# Patient Record
Sex: FEMALE | Race: WHITE
[De-identification: names, ages, dates, MRNs, and addresses within clinical notes are randomized per-mention and may not be internally consistent; named-entity substitution may affect disease eponyms.]

---

## 2017-03-09 ENCOUNTER — HOSPITAL ENCOUNTER (INPATIENT)
Dept: HOSPITAL 62 - LC | Age: 22
LOS: 1 days | Discharge: HOME | End: 2017-03-10
Attending: OBSTETRICS & GYNECOLOGY | Admitting: OBSTETRICS & GYNECOLOGY
Payer: OTHER GOVERNMENT

## 2017-03-09 DIAGNOSIS — F17.210: ICD-10-CM

## 2017-03-09 DIAGNOSIS — Z28.21: ICD-10-CM

## 2017-03-09 DIAGNOSIS — F32.9: ICD-10-CM

## 2017-03-09 DIAGNOSIS — Z3A.40: ICD-10-CM

## 2017-03-09 LAB
APPEARANCE UR: (no result)
BARBITURATES UR QL SCN: NEGATIVE
BASOPHILS # BLD AUTO: 0 10^3/UL (ref 0–0.2)
BASOPHILS NFR BLD AUTO: 0.5 % (ref 0–2)
BILIRUB UR QL STRIP: NEGATIVE
EOSINOPHIL # BLD AUTO: 0.1 10^3/UL (ref 0–0.6)
EOSINOPHIL NFR BLD AUTO: 1.2 % (ref 0–6)
ERYTHROCYTE [DISTWIDTH] IN BLOOD BY AUTOMATED COUNT: 13.4 % (ref 11.5–14)
GLUCOSE UR STRIP-MCNC: NEGATIVE MG/DL
HCT VFR BLD CALC: 29.7 % (ref 36–47)
HGB BLD-MCNC: 10.2 G/DL (ref 12–15.5)
HGB HCT DIFFERENCE: 0.9
KETONES UR STRIP-MCNC: NEGATIVE MG/DL
LYMPHOCYTES # BLD AUTO: 1.7 10^3/UL (ref 0.5–4.7)
LYMPHOCYTES NFR BLD AUTO: 18.2 % (ref 13–45)
MCH RBC QN AUTO: 29.1 PG (ref 27–33.4)
MCHC RBC AUTO-ENTMCNC: 34.4 G/DL (ref 32–36)
MCV RBC AUTO: 85 FL (ref 80–97)
METHADONE UR QL SCN: NEGATIVE
MONOCYTES # BLD AUTO: 0.4 10^3/UL (ref 0.1–1.4)
MONOCYTES NFR BLD AUTO: 4.7 % (ref 3–13)
NEUTROPHILS # BLD AUTO: 7.2 10^3/UL (ref 1.7–8.2)
NEUTS SEG NFR BLD AUTO: 75.4 % (ref 42–78)
NITRITE UR QL STRIP: NEGATIVE
PCP UR QL SCN: NEGATIVE
PH UR STRIP: 6 [PH] (ref 5–9)
PROT UR STRIP-MCNC: 30 MG/DL
RBC # BLD AUTO: 3.51 10^6/UL (ref 3.72–5.28)
SP GR UR STRIP: 1.02
URINE OPIATES LOW: NEGATIVE
UROBILINOGEN UR-MCNC: NEGATIVE MG/DL (ref ?–2)
WBC # BLD AUTO: 9.5 10^3/UL (ref 4–10.5)

## 2017-03-09 PROCEDURE — 85025 COMPLETE CBC W/AUTO DIFF WBC: CPT

## 2017-03-09 PROCEDURE — 4A1HXCZ MONITORING OF PRODUCTS OF CONCEPTION, CARDIAC RATE, EXTERNAL APPROACH: ICD-10-PCS | Performed by: MIDWIFE

## 2017-03-09 PROCEDURE — 80307 DRUG TEST PRSMV CHEM ANLYZR: CPT

## 2017-03-09 PROCEDURE — 81005 URINALYSIS: CPT

## 2017-03-09 PROCEDURE — 86592 SYPHILIS TEST NON-TREP QUAL: CPT

## 2017-03-09 PROCEDURE — 85027 COMPLETE CBC AUTOMATED: CPT

## 2017-03-09 PROCEDURE — 10907ZC DRAINAGE OF AMNIOTIC FLUID, THERAPEUTIC FROM PRODUCTS OF CONCEPTION, VIA NATURAL OR ARTIFICIAL OPENING: ICD-10-PCS | Performed by: MIDWIFE

## 2017-03-09 PROCEDURE — 86900 BLOOD TYPING SEROLOGIC ABO: CPT

## 2017-03-09 PROCEDURE — 86901 BLOOD TYPING SEROLOGIC RH(D): CPT

## 2017-03-09 PROCEDURE — 36415 COLL VENOUS BLD VENIPUNCTURE: CPT

## 2017-03-09 PROCEDURE — 86850 RBC ANTIBODY SCREEN: CPT

## 2017-03-09 RX ADMIN — DOCUSATE SODIUM SCH MG: 100 CAPSULE, LIQUID FILLED ORAL at 18:42

## 2017-03-09 RX ADMIN — FERROUS SULFATE TAB 325 MG (65 MG ELEMENTAL FE) SCH MG: 325 (65 FE) TAB at 18:42

## 2017-03-09 RX ADMIN — IBUPROFEN SCH MG: 800 TABLET, FILM COATED ORAL at 21:01

## 2017-03-09 RX ADMIN — SODIUM CHLORIDE, SODIUM LACTATE, POTASSIUM CHLORIDE, AND CALCIUM CHLORIDE PRN ML: .6; .31; .03; .02 INJECTION, SOLUTION INTRAVENOUS at 13:12

## 2017-03-09 RX ADMIN — PENICILLIN G POTASSIUM SCH UNIT: 5000000 POWDER, FOR SOLUTION INTRAMUSCULAR; INTRAPLEURAL; INTRATHECAL; INTRAVENOUS at 13:14

## 2017-03-09 RX ADMIN — SODIUM CHLORIDE, SODIUM LACTATE, POTASSIUM CHLORIDE, AND CALCIUM CHLORIDE PRN ML: .6; .31; .03; .02 INJECTION, SOLUTION INTRAVENOUS at 13:04

## 2017-03-09 RX ADMIN — PENICILLIN G POTASSIUM SCH UNIT: 5000000 POWDER, FOR SOLUTION INTRAMUSCULAR; INTRAPLEURAL; INTRATHECAL; INTRAVENOUS at 18:42

## 2017-03-09 RX ADMIN — PENICILLIN G POTASSIUM SCH UNIT: 5000000 POWDER, FOR SOLUTION INTRAMUSCULAR; INTRAPLEURAL; INTRATHECAL; INTRAVENOUS at 19:50

## 2017-03-09 NOTE — L&D PROGRESS NOTES
=================================================================

PROGRESS NOTES

=================================================================

Datetime Report Generated by CPN: 2017 12:40

   

   

=================================================================

PROGRESS NOTE

=================================================================

   

Impression:  Normal Progression of Labor

Impression:  Normal Progression of Labor

Procedures:  Artificial ROM; Sterile Vag Exam

Procedures:  Artificial ROM; Sterile Vag Exam

Plan:  Continue Present Management

Plan:  Continue Present Management

Informed Consent Obtained:  Vaginal Delivery

Informed Consent Obtained:  Vaginal Delivery

Vital Signs :  Reviewed

Vital Signs :  Reviewed; Within Normal Limits

Comment:  Comfortable with epidural

   AROM, clear

   Anticipate 

   

=================================================================

VAGINAL EXAM

=================================================================

   

Dilatation:  8

Dilatation:  7

Effacement:  100

Effacement:  90

Station:  1

Station:  0

Contractions:  irregular

Contractions:  2-6

   

=================================================================

MEMBRANES

=================================================================

   

Membranes:  Ruptured

Membranes:  Intact

Amniotic Fluid Color:  Clear

   

=================================================================

FETUS A

=================================================================

   

FHR - Baseline:  125

Monitoring:  External US

Variability:  Moderate 6-25bpm

Decelerations:  None

FHR Category:  Category I

Estimated Fetal Weight (gm):  3400

Fetal Presentation:  Vertex

   

=================================================================

SIGNATURE

=================================================================

   

SIGNATURE:  [id number]

Assignment:  Mariely Hernandez MD

Signature:  Electronically signed by Manisha Ibarra CNM on 3/9/2017 at

   12:38  with User ID: [email or web address]

:  Electronically signed by Manisha Ibarra CNM on 3/9/2017 at 12:38  with

   User ID: Jose

## 2017-03-09 NOTE — DELIVERY SUMMARY
=================================================================

Del Sum A-C

=================================================================

Datetime Report Generated by CPN: 2017 16:03

   

   

=================================================================

ADMISSION DATA

=================================================================

   

Chief Complaint:  Uterine Contractions

Indication for Induction:  Not Applicable

Admission Impression:  Term, Intrauterine Pregnancy; Active Labor;

   Intact Membranes

Admission Impression Comments:  GBS positive

Admit Provider Comments:  Active labor

   GBS positive, pcn

   Pt may have epidural

   Anticipate 

   

=================================================================

DELIVERY PERSONNEL

=================================================================

   

Delivery Doctor::  Manisha Ibarra CNM

Nurse Midwife Certified::  Manisha Ibarra CNM

Labor and Delivery Nurse::  Tin Jc RN

Labor and Delivery Nurse::  Makeda Sabillon RN

Student Observers::  Virginia Amayaub Tech/YAN:  Nancy Lockhart CNA II

   

=================================================================

MATERNAL INFORMATION

=================================================================

   

Delivery Anesthesia:  Epidural

Medications After Delivery:  Pitocin Bolus-Please Comment

Meds After Delivery Comment:  Gave 20 Units/1000ml NS

Estimated  Blood Loss (ml):  200

Maternal Complications:  None

Provider Comments:   of viable female infant over intact perineum,

   head delivered without difficulty, loose nuchal cord X1 noted, 

   reduced, shoulders and body delivered without difficulty.  Infant

   with spontaneous cry and respirations to maternal abdomen cord

   clamped X2, infant cut free after 2 minute delay. Infant kept skin

   to skin. Spontaneous delivery of intact placenta, 3 VC via haley

   mechanism. Vagina and perineum and inspected, no lacerations noted,

   hemostasis acheived with external fundal massage and IV pitocin.

   Mother and infant in stable condition, routine pp care. 

   

=================================================================

LABOR SUMMARY

=================================================================

   

EDC:  2017 00:00

No. Babies in Womb:  1

 Attempted:  No

Labor Anesthesia:  Epidural

   

=================================================================

LABOR INFORMATION

=================================================================

   

Reason for Induction:  Not Applicable

Onset of Labor:  2017 08:45

Complete Dilatation:  2017 13:51

Other Ripening Agents:  None

Oxytocin:  N/A

Group B Beta Strep:  positive

Antibiotics # of Doses:  2

Antibiotics Time of Last Dose:  1300

Name of Antibiotic Given:  PCN

Steroids Given:  None

Reason Steroids Not Administered:  Not Applicable

   

=================================================================

MEMBRANES

=================================================================

   

Membranes Rupture Method:  Artificial

Rupture of Membranes:  2017 12:33

Length of Rupture (hr):  1.33

Amniotic Fluid Color:  Clear

Amniotic Fluid Amount:  Moderate

Amniotic Fluid Odor:  Normal

   

=================================================================

STAGES OF LABOR

=================================================================

   

Stage 1 hr:  5

Stage 1 min:  6

Stage 2 hr:  0

Stage 2 min:  2

Stage 3 hr:  0

Stage 3 min:  4

Total Time in Labor hr:  5

Total Time in Labor min:  12

   

=================================================================

VAGINAL DELIVERY

=================================================================

   

Episiotomy:  None

Laceration Extension:  N/A

Laceration Type:  None

Laceration Repair:  Not Applicable

Laceration Repair Note:  n/a

Sponge Count Correct:  N/A

Sharps Count Correct:  N/A

   

=================================================================

CSECTION DELIVERY

=================================================================

   

Primary Indication:  N/A

Secondary Indication:  N/A

CSection Incidence:  N/A

Labor:  N/A

Elective:  N/A

CSection Incision:  N/A

   

=================================================================

BABY A INFORMATION

=================================================================

   

Infant Delivery Date/Time:  2017 13:53

Method of Delivery:  Vaginal

Born in Route :  No

:  N/A

Forceps:  N/A

Vacuum Extraction:  N/A

Shoulder Dystocia :  No

   

=================================================================

PRESENTATION/POSITION BABY A

=================================================================

   

Presentation:  Cephalic

Cephalic Presentation:  Vertex

Vertex Position:  Left Occipital Anterior

Breech Presentation:  N/A

   

=================================================================

PLACENTA INFORMATION BABY A

=================================================================

   

Placenta Delivery Time :  2017 13:57

Placenta Method of Delivery:  Spontaneous

Placenta Status:  Delivered

   

=================================================================

APGAR SCORES BABY A

=================================================================

   

Heart Rate 1 min:  >100 bpm

Resp Effort 1 min:  Good Cry

Reflex Irritability 1 min:  Cough or Sneeze or Pulls Away

Muscle Tone 1 min:  Active Motion

Color 1 min:  Body Pink, Extremities Blue

Resuscitation Effort 1 min:  N/A

APGAR SCORE 1 MIN:  9

Heart Rate 5 min:  >100 bpm

Resp Effort 5 min:  Good Cry

Reflex Irritability 5 min:  Cough or Sneeze or Pulls Away

Muscle Tone 5 min:  Active Motion

Color 5 min:  Body Pink, Extremities Blue

Resuscitation Effort 5 min:  N/A

APGAR SCORE 5 MIN:  9

   

=================================================================

INFANT INFORMATION BABY A

=================================================================

   

Gestational Age at Delivery:  40.1

Gestational Status:  Full Term- 39- 40.6 Weeks

Infant Outcome :  Liveborn

Infant Condition :  Stable

Infant Sex:  Female

   

=================================================================

IDENTIFICATION BABY A

=================================================================

   

Infant Verification Date/Time:  2017 14:46

ID Band Number:  [id number]

Mother's Name Verified:  Yes

Infant Medical Record Number:  [id number]

RN Verifying Infant:  PO Jc RN

Additional Verifying Personnel:  ELIA Sabillon RN

   

=================================================================

WEIGHT/LENGTH BABY A

=================================================================

   

Infant Birthweight (gm):  3425

Infant Weight (lb):  7

Infant Weight (oz):  9

Infant Length (in):  18.50

Infant Length (cm):  46.99

   

=================================================================

CORD INFORMATION BABY A

=================================================================

   

No. Cord Vessels:  3

Nuchal Cord :  N/A

Cord Blood Taken:  Yes-For Storage (Mom's Blood type +)

Infant Suction:  None

   

=================================================================

ASSESSMENT BABY A

=================================================================

   

Infant Complications:  Other

Infant Complications- Other:  TERMINAL MECONIUM

Physical Findings at Delivery:  Within Normal Limits

Infant Respirations:  Appears Normal

Skin to Skin:  Yes

Skin to Skin Time (min):  60

Neonatologist/ALS Called :  No

Infant Care By:  ELIA SABILLON RN; CLAUDIO JC RN

Transferred To:  Remains with Mother

   

=================================================================

BABY B INFORMATION

=================================================================

   

 :  N/A

   

=================================================================

SIGNATURES

=================================================================

   

Assignment:  Mariely Hernandez MD

Signature:  Electronically signed by Manisha Ibarra CNM on 3/9/2017 at

   14:07  with User ID: [id number]

:  Electronically signed by Manisha Ibarra CNM on 3/9/2017 at 14:07  with

   User ID: Jose

## 2017-03-09 NOTE — L&D FLOW SHEET
=================================================================

LD Flowsheet

=================================================================

Datetime Report Generated by CPN: 03/09/2017 16:00

   

   

=================================================================

Datetime: 03/09/2017 15:45

=================================================================

   

Stage of Pregnancy:  Recovery (Tin Abdalla RN)

 Pulse:  63 (Tin Abdalla RN)

 Respirations:  16 (Tin Abdalla RN)

 Pain Scale:  0 (Tin Abdalla RN)

 Pain Presence:  None/Denies (Tin Abdalla RN)

 Pain Type:  N/A (Tin Abdalla RN)

 Pain Goal:  0 (Tin Abdalla RN)

   

=================================================================

Datetime: 03/09/2017 15:32

=================================================================

   

 NBP Sys/Mela/Mean (mmHg):  116 (QS system process)

:  59 (QS system process)

:  82 (QS system process)

 Pulse:  63 (QS system process)

   

=================================================================

Datetime: 03/09/2017 15:30

=================================================================

   

Stage of Pregnancy:  Recovery (Tin Abdalla RN)

 Respirations:  16 (Tin YOLY Abdalla)

 Pain Scale:  0 (Tin YOLY Abdalla)

 Pain Presence:  None/Denies (Tin YOLY Abdalla)

 Pain Type:  N/A (Tin YOLY Abdalla)

 Pain Goal:  0 (Tin Rm RN)

   

=================================================================

Datetime: 03/09/2017 15:17

=================================================================

   

 NBP Sys/Mela/Mean (mmHg):  125 (QS system process)

:  74 (QS system process)

:  88 (QS system process)

 Pulse:  88 (QS system process)

   

=================================================================

Datetime: 03/09/2017 15:15

=================================================================

   

Stage of Pregnancy:  Recovery (Tin Rm, RN)

 Pain Scale:  0 (Tin Abdalla RN)

 Pain Presence:  None/Denies (Tin Abdalla RN)

 Pain Type:  N/A (Tin Abdalla RN)

 Pain Goal:  0 (Tin Abdalla RN)

   

=================================================================

Datetime: 03/09/2017 15:02

=================================================================

   

 NBP Sys/Mela/Mean (mmHg):  117 (QS system process)

:  66 (QS system process)

:  86 (QS system process)

 Pulse:  73 (QS system process)

   

=================================================================

Datetime: 03/09/2017 15:00

=================================================================

   

Stage of Pregnancy:  Recovery (Tin Rm, RN)

 Pain Scale:  0 (Tin Abdalla RN)

 Pain Presence:  None/Denies (Tin Abdalla RN)

 Pain Type:  N/A (Tin Abdalla RN)

 Pain Goal:  0 (Tin Abdalla RN)

   

=================================================================

Datetime: 03/09/2017 14:47

=================================================================

   

 NBP Sys/Mela/Mean (mmHg):  118 (QS system process)

:  63 (QS system process)

:  86 (QS system process)

 Pulse:  69 (QS system process)

   

=================================================================

Datetime: 03/09/2017 14:45

=================================================================

   

Stage of Pregnancy:  Recovery (Tin Abdalla RN)

 Pulse:  73 (Tin Abdalla RN)

 Respirations:  16 (Tin Abdalla RN)

 Temperature (F):  97.8 (Tin Abdalla RN)

 Temperature (C):  36.6 (QS system process)

 Temperature Route:  Oral (Tin Abdalla RN)

 Pain Scale:  0 (Tin Abdalla RN)

 Pain Presence:  None/Denies (Tin Abdalla RN)

 Pain Type:  N/A (Tin Abdalla RN)

 Pain Goal:  0 (Tin Rm, RN)

   

=================================================================

Datetime: 03/09/2017 14:32

=================================================================

   

 NBP Sys/Mela/Mean (mmHg):  120 (QS system process)

:  60 (QS system process)

:  87 (QS system process)

 Pulse:  74 (QS system process)

   

=================================================================

Datetime: 03/09/2017 14:30

=================================================================

   

Stage of Pregnancy:  Recovery (Tin Rm, RN)

   

=================================================================

Datetime: 03/09/2017 14:18

=================================================================

   

 NBP Sys/Mela/Mean (mmHg):  116 (QS system process)

:  56 (QS system process)

:  81 (QS system process)

 Pulse:  71 (QS system process)

   

=================================================================

Datetime: 03/09/2017 14:15

=================================================================

   

Stage of Pregnancy:  Recovery (Tin Rm, RN)

 Pain Scale:  1 (Tin Abdalla RN)

 Pain Presence:  None/Denies (Tin Abdalla RN)

 Pain Type:  Sore in perineum area (Tin Abdalla RN)

 Pain Location:  Perineum (Tin Abdalla RN)

 Pain Goal:  0 (Tin Abdalla RN)

 Pain Relief Measures:  Comfort Measures (Tin Abdalla RN)

   

=================================================================

Datetime: 03/09/2017 14:02

=================================================================

   

 NBP Sys/Mela/Mean (mmHg):  135 (QS system process)

:  60 (QS system process)

:  87 (QS system process)

 Pulse:  74 (QS system process)

   

=================================================================

Datetime: 03/09/2017 14:00

=================================================================

   

Stage of Pregnancy:  Postpartum (Tin Abdalla RN)

 Monitor Mode:  External US (Tin Abdalla RN)

 FHR Baseline Rate :  130 (Tin Abdalla RN)

 Variability:  Moderate 6-25 bpm (Tin Abdalla, RN)

 Accelerations:  15X15 (Tin Abdalla, RN)

 Decelerations:  None (Tin Abdalla, RN)

 Pain Scale:  1 (Tin Abdalla RN)

 Pain Presence:  None/Denies (Tin Abdalla RN)

 Pain Type:  Cramping (Tin Abdalla, YOLY)

 Pain Location:  Perineum (Tin Abdalla, RN)

 Pain Goal:  0 (Tin Abdalla RN)

 Pain Relief Measures:  Comfort Measures (Tin Abdalla RN)

## 2017-03-09 NOTE — L&D FLOW SHEET
=================================================================

LD Flowsheet

=================================================================

Datetime Report Generated by CPN: 03/09/2017 14:00

   

   

=================================================================

Datetime: 03/09/2017 13:48

=================================================================

   

 NBP Sys/Mela/Mean (mmHg):  135 (QS system process)

:  78 (QS system process)

:  102 (QS system process)

 Pulse:  72 (QS system process)

 LaborFlag:  Labor (QS system process)

   

=================================================================

Datetime: 03/09/2017 13:32

=================================================================

   

 NBP Sys/Mela/Mean (mmHg):  122 (QS system process)

:  70 (QS system process)

:  90 (QS system process)

 Pulse:  50 (QS system process)

 LaborFlag:  Labor (QS system process)

   

=================================================================

Datetime: 03/09/2017 13:29

=================================================================

   

 Monitor Mode:  External US (Tin Abdalla RN)

 FHR Baseline Rate :  125 (Tin Abdalla, RN)

 Variability:  Moderate 6-25 bpm (Tin Abdalla, RN)

 Accelerations:  10X10 (Tin Abdalla, RN)

 Decelerations:  None (Tin Abdalla, RN)

 Patient Position/Activity:  Left Lateral; Peanut Ball (Tin Abdalla,

   RN)

   

=================================================================

Datetime: 03/09/2017 13:17

=================================================================

   

 NBP Sys/Mela/Mean (mmHg):  133 (QS system process)

:  63 (QS system process)

:  88 (QS system process)

 Pulse:  58 (QS system process)

 LaborFlag:  Labor (QS system process)

   

=================================================================

Datetime: 03/09/2017 13:15

=================================================================

   

 Monitor Mode:  External (Tin Rm, RN)

 Frequency (min):  3-5 (Tin Rm, RN)

 Quality:  Mild/Moderate (Tin Rm, RN)

 Duration (sec):   (Tin Rm, RN)

 Resting Tone (Palpate):  Relaxed (Tin Rm, RN)

 Monitor Mode:  External US (Tin Rm, RN)

 FHR Baseline Rate :  125 (Tin Rm, RN)

 Variability:  Minimal - Undetectable to <=5 bpm (Tin Rm, RN)

 Accelerations:  None (Tin Rm, RN)

 Decelerations:  None (Tin Rm, RN)

   

=================================================================

Datetime: 03/09/2017 13:02

=================================================================

   

 NBP Sys/Mela/Mean (mmHg):  103 (QS system process)

:  56 (QS system process)

:  75 (QS system process)

 Pulse:  62 (QS system process)

 LaborFlag:  Labor (QS system process)

   

=================================================================

Datetime: 03/09/2017 13:00

=================================================================

   

 Monitor Mode:  External (Tin Mr, RN)

 Frequency (min):  3-5 (Tin Rm, RN)

 Quality:  Mild/Moderate (Tin Rm, RN)

 Duration (sec):   (Tin Rm, RN)

 Resting Tone (Palpate):  Relaxed (Tin Rm, RN)

 Monitor Mode:  External US (Tin Rm, RN)

 FHR Baseline Rate :  125 (Tin Rm, RN)

 Variability:  Moderate 6-25 bpm (Tin Rm, RN)

 Accelerations:  10X10 (Tin Rm, RN)

 Decelerations:  None (Tin Rm, RN)

 Antibiotics:  Penicillin IV (Units) @ [id number] (Tin Rm, RN)

   

=================================================================

Datetime: 03/09/2017 12:49

=================================================================

   

 NBP Sys/Mela/Mean (mmHg):  111 (QS system process)

:  58 (QS system process)

:  81 (QS system process)

 Pulse:  55 (QS system process)

 LaborFlag:  Labor (QS system process)

   

=================================================================

Datetime: 03/09/2017 12:45

=================================================================

   

 Monitor Mode:  External (Tin Rm, RN)

 Frequency (min):  Irregular (Tin Rm, RN)

 Quality:  Mild/Moderate (Tin Rm, RN)

 Resting Tone (Palpate):  Relaxed (Tin Rm, RN)

 Monitor Mode:  External US (Tin Rm, RN)

 FHR Baseline Rate :  125 (Tin Rm, RN)

 Variability:  Moderate 6-25 bpm (Tin Rm, RN)

 Accelerations:  15X15 (Tin Rm, RN)

 Decelerations:  None (Tin Rm, RN)

   

=================================================================

Datetime: 03/09/2017 12:38

=================================================================

   

 Patient Position/Activity:  High Fowlers; Tailors (Tin Rm, RN)

 I/O Interventions:  Clear Liquids Given (Tin Rm, RN)

   

=================================================================

Datetime: 03/09/2017 12:33

=================================================================

   

Stage of Pregnancy:  Labor (Makeda Sabillon RN)

 Dilatation (cm):  8.0 (Makeda Sabillon RN)

 Effacement (%):  100 (Makeda Sabillon RN)

 Station:  1 (Makeda Sabillon RN)

 Exam by:  SHAILESH GARZA CNM (Makeda Sabillon RN)

 Membrane Status:  Ruptured (Makeda Sabillon RN)

 Membranes Rupture Method:  Artificial (Makeda Sabillon RN)

 Amniotic Fluid Color:  Clear (Makeda Sabillon RN)

 Amniotic Fluid Amount:  Moderate (Makeda Sabillon RN)

 Vaginal Bleeding:  Small (Makeda Sabillon RN)

 Cervix, Consistency:  Soft (Makeda Sabillon RN)

 Cervix, Position:  Anterior (Makeda Sabillon RN)

 Provider Reviewed Strip:  Yes (Makeda Sabillon RN)

 Communication:  RN at Bedside; RN Reviewed Strip; Provider at Bedside

   (Makeda Sabillon RN)

 Communication Comments:  CNM @ BS (Makeda Sabillon RN)

   

=================================================================

Datetime: 03/09/2017 12:32

=================================================================

   

 NBP Sys/Mela/Mean (mmHg):  99 (QS system process)

:  61 (QS system process)

:  74 (QS system process)

 LaborFlag:  Labor (QS system process)

   

=================================================================

Datetime: 03/09/2017 12:30

=================================================================

   

Stage of Pregnancy:  Labor (Makeda Sabillon RN)

 Respirations:  18 (Makeda Sabillon RN)

 Temperature (F):  98.0 (Makeda Sabillon RN)

 Temperature (C):  36.7 (QS system process)

 Monitor Mode:  External (Makeda Sabillon RN)

 Frequency (min):  IRREG (Makeda Sabillon RN)

 Quality:  Moderate (Makeda Sabillon RN)

 Duration (sec):  60-90 (Makeda Sabillon RN)

 Resting Tone (Palpate):  Relaxed (Makeda Sabillon RN)

 Monitor Mode:  External US (Makeda Sabillon RN)

 FHR Baseline Rate :  120 (Makeda Sabillon RN)

 FHR Baseline Changes:  No Baseline Change (Makeda Sabillon RN)

 Variability:  Moderate 6-25 bpm (Makeda Sabillon RN)

 Accelerations:  15X15 (Makeda Sabillon RN)

 Decelerations:  None (Makeda Sabillon RN)

 Pain Scale:  0 (Makeda Sabillon RN)

 Pain Presence:  None/Denies (Makeda Sabillon RN)

 Pain Type:  N/A (Makeda Sabillon RN)

 Pain Relief Measures:  Comfort Measures (Makeda Sabillon RN)

 Pain Coping:  Talking Through Contractions (Makeda Sabillon RN)

 IV/Blood Work:  IV Infusing per Order (Makeda Sabillon, RN)

 Patient Position/Activity:  Left Extreme; Peanut Ball (Makeda Sabillon, RN)

 Comfort Measures:  Family Support (Makeda Sabillon RN)

 Communication:  RN at Bedside; RN Reviewed Strip (Makeda Sabillon RN)

 LaborFlag:  Labor (QS system process)

   

=================================================================

Datetime: 03/09/2017 12:17

=================================================================

   

 NBP Sys/Mela/Mean (mmHg):  102 (QS system process)

:  59 (QS system process)

:  78 (QS system process)

 Pulse:  52 (QS system process)

 LaborFlag:  Labor (QS system process)

   

=================================================================

Datetime: 03/09/2017 12:15

=================================================================

   

 Temperature (F):  98.0 (Tin Abdalla RN)

 Temperature (C):  36.7 (QS system process)

 Temperature Route:  Oral (Tin Abdalla RN)

 Monitor Mode:  External (Tin Abdalla RN)

 Frequency (min):  Irregular  (Tin Abdalla RN)

 Quality:  Mild/Moderate (Tin Abdalla RN)

 Resting Tone (Palpate):  Relaxed (Tin Abdalla RN)

 Monitor Mode:  External US (Tin Abdalla RN)

 FHR Baseline Rate :  125 (Tin Abdalla RN)

 Variability:  Moderate 6-25 bpm (Tin Abdalla RN)

 Accelerations:  15X15 (Tin Abdalla RN)

 Decelerations:  None (Tin Abdalla RN)

 Patient Position/Activity:  HOB Lowered; Left Lateral; Peanut Ball

   (Tin Abdalla RN)

 LaborFlag:  Labor (QS system process)

   

=================================================================

Datetime: 03/09/2017 12:02

=================================================================

   

 NBP Sys/Mela/Mean (mmHg):  96 (QS system process)

:  51 (QS system process)

:  67 (QS system process)

 Pulse:  61 (QS system process)

 LaborFlag:  Labor (QS system process)

   

=================================================================

Datetime: 03/09/2017 12:00

=================================================================

   

 Monitor Mode:  External (Tin Abdalla RN)

 Frequency (min):  Irregular  (Tin Abdalla RN)

 Quality:  Mild/Moderate (Tin Abdalla RN)

 Resting Tone (Palpate):  Relaxed (Tin Abdalla RN)

 Monitor Mode:  External US (Tin Abdalla RN)

 FHR Baseline Rate :  120 (Tin Abdalla RN)

 Variability:  Moderate 6-25 bpm (Tin Abdalla RN)

 Accelerations:  15X15 (Tin Abdalla RN)

 Decelerations:  None (Tin Abdalla RN)

## 2017-03-09 NOTE — L&D FLOW SHEET
=================================================================

LD Flowsheet

=================================================================

Datetime Report Generated by CPN: 03/09/2017 10:00

   

   

=================================================================

Datetime: 03/09/2017 09:00

=================================================================

   

   

=================================================================

Uterine Activity

=================================================================

   

 Resting Tone (Palpate):  Relaxed (Tin Rm, RN)

   

=================================================================

Fetal Assessment A

=================================================================

   

 Monitor Mode:  External US (Tin Rm, RN)

   

=================================================================

Pain

=================================================================

   

 Pain Scale:  4 (Tin Rm, RN)

 Pain Presence:  Intermittent ([facility], )

 Pain Type:  Contraction ([facility], RN)

 Pain Location:  Abdomen; Back (Tin Rm, RN)

 Pain Goal:  1 ([facility], RN)

 Pain Coping:  Breathing Through Contractions ([facility], RN)

   

=================================================================

Vaginal Exam

=================================================================

   

 Dilatation (cm):  6.0 (Tin YOLY Abdalla)

 Effacement (%):  90 (Tin Rm, RN)

 Station:  0 (Tin Rm, RN)

 Membrane Status:  Intact (Tin Rm, RN)

 Vaginal Bleeding:  Scant (Tin Rm, RN)

   

=================================================================

Maternal Assessment

=================================================================

   

 Level of Consciousness:  Fully Conscious (Tin Abdalla, RN)

 Headache:  Denies (Tin Abdalla, RN)

 Nausea/Vomiting:  Denies (Tin Abdalla, RN)

 RUQ Epigastric Pain:  Denies (Tinyan Abdalla, RN)

   

=================================================================

Medications

=================================================================

   

 Antibiotics:  Penicillin IV (Units) @ (Tinyan Abdalla, RN)

   

=================================================================

Patient Care

=================================================================

   

 Patient Position/Activity:  HOB Lowered; Right Lateral (Tinyan Abdalla,

   RN)

   

=================================================================

Datetime: 03/09/2017 08:56

=================================================================

   

   

=================================================================

Vital Signs

=================================================================

   

 NBP Sys/Mela/Mean (mmHg):  107 (QS system process)

:  69 (QS system process)

:  80 (QS system process)

 Pulse:  104 (QS system process)

## 2017-03-09 NOTE — L&D FLOW SHEET
=================================================================

LD Flowsheet

=================================================================

Datetime Report Generated by CPN: 2017 19:00

   

   

=================================================================

Datetime: 2017 17:02

=================================================================

   

   

=================================================================

Vital Signs

=================================================================

   

Stage of Pregnancy:  Recovery (Makeda Jennifer Roulund, RN)

   

=================================================================

Datetime: 2017 16:47

=================================================================

   

 NBP Sys/Mela/Mean (mmHg):  116 (QS system process)

:  61 (QS system process)

:  83 (QS system process)

 Pulse:  57 (QS system process)

   

=================================================================

Datetime: 2017 16:45

=================================================================

   

   

=================================================================

Vital Signs

=================================================================

   

Stage of Pregnancy:  Recovery (Tinshailesh Abdalla, RN)

 Pulse:  57 (Tin Rm, RN)

 Respirations:  16 (Tin Abdalla, RN)

   

=================================================================

Pain

=================================================================

   

 Pain Scale:  0 (Tin Rm, RN)

 Pain Presence:  None/Denies (Tin Rm, RN)

 Pain Type:  N/A (Tin Rm, RN)

 Pain Goal:  0 (Tin Rm, RN)

   

=================================================================

Datetime: 2017 16:32

=================================================================

   

 NBP Sys/Mela/Mean (mmHg):  111 (QS system process)

:  54 (QS system process)

:  78 (QS system process)

 Pulse:  65 (QS system process)

   

=================================================================

Datetime: 2017 16:30

=================================================================

   

   

=================================================================

Vital Signs

=================================================================

   

Stage of Pregnancy:  Recovery (Tin Abdalla, RN)

 Pulse:  68 (Tin Abdalla, RN)

 Respirations:  16 (Tin Abdalla, RN)

   

=================================================================

Pain

=================================================================

   

 Pain Scale:  1 (Tin Abdalla, RN)

 Pain Presence:  Intermittent (Tin Abdalla, RN)

 Pain Type:  Cramping; Dull (Tin Abdalla, RN)

 Pain Location:  Abdomen (Tin Abdalla, RN)

 Pain Goal:  0 (Tin Abdalla, RN)

 Pain Relief Measures:  Comfort Measures (Tin Abdalla, RN)

   

=================================================================

Datetime: 2017 16:17

=================================================================

   

   

=================================================================

Vital Signs

=================================================================

   

Stage of Pregnancy:  Recovery (Makeda Rodriguez Elviasophiand, RN)

 NBP Sys/Mela/Mean (mmHg):  109 (QS system process)

:  55 (QS system process)

:  75 (QS system process)

 Pulse:  68 (QS system process)

 Respirations:  18 (Makeda Sabillon, RN)

   

=================================================================

Datetime: 2017 16:03

=================================================================

   

   

=================================================================

Vital Signs

=================================================================

   

Stage of Pregnancy:  Recovery (Tin Salgadoford, RN)

 NBP Sys/Mela/Mean (mmHg):  135 (QS system process)

:  62 (QS system process)

:  89 (QS system process)

 Pulse:  60 (QS system process)

   

=================================================================

Pain

=================================================================

   

 Pain Scale:  1 (Tin Abdalla RN)

 Pain Presence:  Intermittent (Tin Abdalla RN)

 Pain Type:  Cramping; Dull (Tin Abdalla RN)

 Pain Location:  Abdomen (Tin Abdalla RN)

 Pain Goal:  0 (Tin Abdalla RN)

 Pain Relief Measures:  Pt declines pain medication  (Tin Abdalla RN)

   

=================================================================

Datetime: 2017 16:00

=================================================================

   

   

=================================================================

Vital Signs

=================================================================

   

Stage of Pregnancy:  Recovery (Tin Abdalla RN)

 Pulse:  60 (Tin Abdalla RN)

 Respirations:  16 (Tin Abdalla RN)

 Temperature (F):  98.9 (Tin Rm, RN)

 Temperature (C):  37.2 (QS system process)

 Temperature Route:  Oral (Tin Abdalla, RN)

   

=================================================================

Pain

=================================================================

   

 Pain Scale:  2 (Tin Rm, RN)

 Pain Presence:  Intermittent (Tin Rm, RN)

 Pain Type:  Cramping; Dull (Tin Rm, RN)

 Pain Location:  Abdomen ([facility])

 Pain Goal:  0 ([facility])

 Pain Relief Measures:  Pt declines medication for cramping  (Tin

   Rm, RN)

   

=================================================================

Datetime: 2017 15:45

=================================================================

   

   

=================================================================

Vital Signs

=================================================================

   

Stage of Pregnancy:  Recovery (Tin Abdalla, )

 Pulse:  63 (Tin Abdalla, RN)

 Respirations:  16 (Tin Abdalla, YOLY)

   

=================================================================

Pain

=================================================================

   

 Pain Scale:  0 (Tin Abdalla, RN)

 Pain Presence:  None/Denies (Tin Abdalla, RN)

 Pain Type:  N/A (Tin Abdalla, RN)

 Pain Goal:  0 (Tin Abdalla, RN)

   

=================================================================

Datetime: 2017 15:32

=================================================================

   

 NBP Sys/Mela/Mean (mmHg):  116 (QS system process)

:  59 (QS system process)

:  82 (QS system process)

 Pulse:  63 (QS system process)

   

=================================================================

Datetime: 2017 15:30

=================================================================

   

   

=================================================================

Vital Signs

=================================================================

   

Stage of Pregnancy:  Recovery (Tin Abdalla, RN)

 Respirations:  16 (Tinshailesh Abdalla, RN)

   

=================================================================

Pain

=================================================================

   

 Pain Scale:  0 (Tin Abdalla, RN)

 Pain Presence:  None/Denies (Tin Abdalla, RN)

 Pain Type:  N/A (Tin Abdalla, RN)

 Pain Goal:  0 (Tin Abdalla, RN)

   

=================================================================

Datetime: 2017 15:17

=================================================================

   

   

=================================================================

Vital Signs

=================================================================

   

Stage of Pregnancy:  Recovery (Makeda Jennifer Elvialund, RN)

 NBP Sys/Mela/Mean (mmHg):  125 (QS system process)

:  74 (QS system process)

:  88 (QS system process)

 Pulse:  88 (QS system process)

 Respirations:  18 (Makeda Jennifer Roulund, RN)

   

=================================================================

Datetime: 2017 15:15

=================================================================

   

   

=================================================================

Vital Signs

=================================================================

   

Stage of Pregnancy:  Recovery (Tin Salgadoford, RN)

   

=================================================================

Pain

=================================================================

   

 Pain Scale:  0 (Tin Abdalla, RN)

 Pain Presence:  None/Denies (Tin Abdalla, RN)

 Pain Type:  N/A (Tinshailesh Abdalla, RN)

 Pain Goal:  0 (Tinshailesh Abdalla, RN)

   

=================================================================

Datetime: 2017 15:02

=================================================================

   

   

=================================================================

Vital Signs

=================================================================

   

Stage of Pregnancy:  Recovery (Makeda Jennifer Ridge, RN)

 NBP Sys/Mela/Mean (mmHg):  117 (QS system process)

:  66 (QS system process)

:  86 (QS system process)

 Pulse:  73 (QS system process)

 Respirations:  18 (Makeda Sabillon, RN)

   

=================================================================

Datetime: 2017 15:00

=================================================================

   

   

=================================================================

Vital Signs

=================================================================

   

Stage of Pregnancy:  Recovery (Tinshailesh Abdalla, RN)

   

=================================================================

Pain

=================================================================

   

 Pain Scale:  0 (Tin Rm, RN)

 Pain Presence:  None/Denies (Tin Abdalla, RN)

 Pain Type:  N/A (Tin Abdalla, RN)

 Pain Goal:  0 (Tin Abdalla, RN)

   

=================================================================

Datetime: 2017 14:47

=================================================================

   

   

=================================================================

Vital Signs

=================================================================

   

Stage of Pregnancy:  Recovery (Makeda Bynumnd, RN)

 NBP Sys/Mela/Mean (mmHg):  118 (QS system process)

:  63 (QS system process)

:  86 (QS system process)

 Pulse:  69 (QS system process)

 Respirations:  18 (Makeda Pradoed Sabillon, RN)

   

=================================================================

Datetime: 2017 14:45

=================================================================

   

   

=================================================================

Vital Signs

=================================================================

   

Stage of Pregnancy:  Recovery (Tin Rm, RN)

 Pulse:  73 (Tin Rm, RN)

 Respirations:  16 (Tin Rm, RN)

 Temperature (F):  97.8 (Tin Abdalla, RN)

 Temperature (C):  36.6 (QS system process)

 Temperature Route:  Oral (Tin Rm, RN)

   

=================================================================

Pain

=================================================================

   

 Pain Scale:  0 (Tin Rm, RN)

 Pain Presence:  None/Denies (Tin Rm, RN)

 Pain Type:  N/A (Tin Rm, RN)

 Pain Goal:  0 (Tin Rm, RN)

   

=================================================================

Datetime: 2017 14:32

=================================================================

   

   

=================================================================

Vital Signs

=================================================================

   

Stage of Pregnancy:  Recovery (Makeda Jennifer Elvialund, RN)

 NBP Sys/Mela/Mean (mmHg):  120 (QS system process)

:  60 (QS system process)

:  87 (QS system process)

 Pulse:  74 (QS system process)

 Respirations:  18 (Makeda Jennifer Roulund, RN)

   

=================================================================

Datetime: 2017 14:30

=================================================================

   

   

=================================================================

Vital Signs

=================================================================

   

Stage of Pregnancy:  Recovery (Tin Rm, RN)

   

=================================================================

Pain

=================================================================

   

 Pain Scale:  1 (Makeda Sabillon, RN)

 Pain Presence:  None/Denies (Makeda Sabillon, RN)

 Pain Type:  N/A (Makeda Jennifer Sabillon, RN)

 Pain Relief Measures:  Comfort Measures (Makeda Jennifer Sabillon, RN)

   

=================================================================

Datetime: 2017 14:18

=================================================================

   

   

=================================================================

Vital Signs

=================================================================

   

Stage of Pregnancy:  Recovery (Makeda Sabillon, RN)

 NBP Sys/Mlea/Mean (mmHg):  116 (QS system process)

:  56 (QS system process)

:  81 (QS system process)

 Pulse:  71 (QS system process)

 Respirations:  18 (Makeda Ganlund, RN)

   

=================================================================

Datetime: 2017 14:15

=================================================================

   

   

=================================================================

Vital Signs

=================================================================

   

Stage of Pregnancy:  Recovery (Tin Rm, RN)

   

=================================================================

Pain

=================================================================

   

 Pain Scale:  1 (Tin Abdalla, RN)

 Pain Presence:  None/Denies (Tin Salgadoford, RN)

 Pain Type:  Sore in perineum area (Tin Salgadoford, RN)

 Pain Location:  Perineum (Tin Salgadoford, RN)

 Pain Goal:  0 (Tin Salgadoford, RN)

 Pain Relief Measures:  Comfort Measures (Tin Abdalla, RN)

   

=================================================================

Datetime: 2017 14:02

=================================================================

   

 NBP Sys/Mela/Mean (mmHg):  135 (QS system process)

:  60 (QS system process)

:  87 (QS system process)

 Pulse:  74 (QS system process)

   

=================================================================

Datetime: 2017 14:00

=================================================================

   

   

=================================================================

Vital Signs

=================================================================

   

Stage of Pregnancy:  Postpartum (Tin Rm, RN)

   

=================================================================

Fetal Assessment A

=================================================================

   

 Monitor Mode:  External US ([facility], )

 FHR Baseline Rate :  130 ([facility], RN)

 Variability:  Moderate 6-25 bpm ([facility], RN)

 Accelerations:  15X15 ([facility], RN)

 Decelerations:  None ([facility], RN)

   

=================================================================

Pain

=================================================================

   

 Pain Scale:  1 ([facility], RN)

 Pain Presence:  None/Denies ([facility], RN)

 Pain Type:  Cramping ([facility], RN)

 Pain Location:  Perineum ([facility], RN)

 Pain Goal:  0 ([facility], RN)

 Pain Relief Measures:  Comfort Measures ([facility], RN)

   

=================================================================

Datetime: 2017 13:52

=================================================================

   

   

=================================================================

Vital Signs

=================================================================

   

Stage of Pregnancy:  Labor (Makeda Sabillon RN)

   

=================================================================

Stage 2

=================================================================

   

 Pushing:  Coached on Pushing; Urge to Push (Makeda Sabillon RN)

 Pushing Position:  Pushing with Contractions (Makeda Sabillon RN)

 Pushing Progress:  Descent with Pushing;  with Pushing;

   Pushing Effectively with Contractions (Makeda Sabillon RN)

 Stage 2 Comments:   OF VIABLE FEMALE @1352. APGARS 9/9. SEE

   DELIVERY SUMMARY. (Makeda Jennifer Roulund, RN)

   

=================================================================

Communication

=================================================================

   

 Communication:  RN at Bedside; RN Reviewed Strip; Provider at Bedside

   (Makeda Sabillon, RN)

   

=================================================================

Datetime: 2017 13:51

=================================================================

   

   

=================================================================

Vaginal Exam

=================================================================

   

 Dilatation (cm):  10.0 (Tin Abdalla, RN)

 Effacement (%):  100 (Tin Abdalla RN)

 Station:  2 (Tin Abdalla, RN)

 Exam by:  Fred, H (Tin Abdalla RN)

   

=================================================================

Communication

=================================================================

   

 Communication:  RN at Bedside; Provider at Bedside (Tin Abdalla RN)

 Communication Comments:  PT began pushing  (Tin Abdalla, YOLY)

   

=================================================================

Datetime: 2017 13:48

=================================================================

   

   

=================================================================

Vital Signs

=================================================================

   

Stage of Pregnancy:  Labor (Tin Abdalla RN)

 NBP Sys/Mela/Mean (mmHg):  135 (QS system process)

:  78 (QS system process)

:  102 (QS system process)

 Pulse:  72 (QS system process)

 Respirations:  18 (Tin Abdalla RN)

 LaborFlag:  Labor (QS system process)

   

=================================================================

Datetime: 2017 13:45

=================================================================

   

   

=================================================================

Uterine Activity

=================================================================

   

 Monitor Mode:  External (Tin Rm, RN)

 Frequency (min):  1.5-2 (Tin Abdalla, RN)

 Quality:  Mild/Moderate (Tin Rm, RN)

 Duration (sec):  50-90 (Tin Rm, RN)

 Resting Tone (Palpate):  Relaxed (Tin Abdalla, RN)

   

=================================================================

Fetal Assessment A

=================================================================

   

 Monitor Mode:  External US (Tin Abdalla, RN)

 FHR Baseline Rate :  125 (Tin Abdalla, RN)

 Variability:  Moderate 6-25 bpm (Tin Abdalla, RN)

 Accelerations:  15X15 (Tin Abdalla, RN)

 Decelerations:  None (Tin Rm, RN)

   

=================================================================

Datetime: 2017 13:32

=================================================================

   

   

=================================================================

Vital Signs

=================================================================

   

Stage of Pregnancy:  Labor (Tin Abdalla, RN)

 NBP Sys/Mela/Mean (mmHg):  122 (QS system process)

:  70 (QS system process)

:  90 (QS system process)

 Pulse:  50 (QS system process)

 Respirations:  18 (Tin Abdalla, RN)

 LaborFlag:  Labor (QS system process)

   

=================================================================

Datetime: 2017 13:29

=================================================================

   

   

=================================================================

Fetal Assessment A

=================================================================

   

 Monitor Mode:  External US (Tin Rm, RN)

 FHR Baseline Rate :  125 (Tin Rm, RN)

 Variability:  Moderate 6-25 bpm (Tin Rm, RN)

 Accelerations:  10X10 (Tin Rm, RN)

 Decelerations:  None (Tin Rm, RN)

 Patient Position/Activity:  Left Lateral; Peanut Ball (Tin Abdalla,

   RN)

   

=================================================================

Datetime: 2017 13:17

=================================================================

   

   

=================================================================

Vital Signs

=================================================================

   

Stage of Pregnancy:  Labor (Tin Abdalla RN)

 NBP Sys/Mela/Mean (mmHg):  133 (QS system process)

:  63 (QS system process)

:  88 (QS system process)

 Pulse:  58 (QS system process)

 Respirations:  18 (Tin Abdalla, YOLY)

 LaborFlag:  Labor (QS system process)

   

=================================================================

Datetime: 2017 13:15

=================================================================

   

   

=================================================================

Uterine Activity

=================================================================

   

 Monitor Mode:  External (Tin Abdalla, OYLY)

 Frequency (min):  3-5 (Tin Abdalla RN)

 Quality:  Mild/Moderate (Tin Abdalla RN)

 Duration (sec):   (Tin Abdalla RN)

 Resting Tone (Palpate):  Relaxed (Tin Abdalla RN)

   

=================================================================

Fetal Assessment A

=================================================================

   

 Monitor Mode:  External US (Tin Salgadoford, RN)

 FHR Baseline Rate :  125 (Tin Rm, RN)

 Variability:  Minimal - Undetectable to <=5 bpm (Tin Rm, RN)

 Accelerations:  None (Tin Rm, RN)

 Decelerations:  None (Tin Salgadoford, RN)

   

=================================================================

Datetime: 2017 13:02

=================================================================

   

 NBP Sys/Mela/Mean (mmHg):  103 (QS system process)

:  56 (QS system process)

:  75 (QS system process)

 Pulse:  62 (QS system process)

 LaborFlag:  Labor (QS system process)

   

=================================================================

Datetime: 2017 13:00

=================================================================

   

   

=================================================================

Vital Signs

=================================================================

   

Stage of Pregnancy:  Labor (Tin Rm, RN)

   

=================================================================

Uterine Activity

=================================================================

   

 Monitor Mode:  External (Tin Rm, RN)

 Frequency (min):  3-5 (Tin Rm, RN)

 Quality:  Mild/Moderate (Tin Rm, RN)

 Duration (sec):   (Tin Rm, RN)

 Resting Tone (Palpate):  Relaxed (Tin Rm, RN)

   

=================================================================

Fetal Assessment A

=================================================================

   

 Monitor Mode:  External US (Tin Abdalla, RN)

 FHR Baseline Rate :  125 (Tin Abdalla, RN)

 Variability:  Moderate 6-25 bpm (Tin Rm, RN)

 Accelerations:  10X10 (Tin Salgadoford, RN)

 Decelerations:  None (Tin Salgadoford, RN)

   

=================================================================

Pain

=================================================================

   

 Pain Scale:  0 (Tin Abdalla, RN)

 Pain Presence:  None/Denies (Tin Rm, RN)

 Pain Type:  N/A (Tinshailesh Abdalla, RN)

 Pain Relief Measures:  Comfort Measures (Tinshailesh Abdalla, RN)

 Pain Coping:  Talking Through Contractions (Tin Rm, RN)

   

=================================================================

Medications

=================================================================

   

 Antibiotics:  Penicillin IV (Units) @ [id number] (Tin Mr, RN)

 LaborFlag:  Labor (QS system process)

   

=================================================================

Datetime: 2017 12:49

=================================================================

   

   

=================================================================

Vital Signs

=================================================================

   

Stage of Pregnancy:  Labor (Tin Abdalla, RN)

 NBP Sys/Mela/Mean (mmHg):  111 (QS system process)

:  58 (QS system process)

:  81 (QS system process)

 Pulse:  55 (QS system process)

 Respirations:  18 (Tin Abdalla, RN)

 LaborFlag:  Labor (QS system process)

   

=================================================================

Datetime: 2017 12:45

=================================================================

   

   

=================================================================

Uterine Activity

=================================================================

   

 Monitor Mode:  External (Tin Rm, RN)

 Frequency (min):  Irregular (Tin Rm, RN)

 Quality:  Mild/Moderate (Tin Rm, RN)

 Resting Tone (Palpate):  Relaxed (Tin Rm, RN)

   

=================================================================

Fetal Assessment A

=================================================================

   

 Monitor Mode:  External US (Tin Rm, RN)

 FHR Baseline Rate :  125 (Tin Rm, RN)

 Variability:  Moderate 6-25 bpm (Tin Rm, RN)

 Accelerations:  15X15 (Tin Rm, RN)

 Decelerations:  None (Tin Rm, RN)

   

=================================================================

Datetime: 2017 12:38

=================================================================

   

 Patient Position/Activity:  High Fowlers; Tailors (Tin Rm, RN)

 I/O Interventions:  Clear Liquids Given (Tin Rm, RN)

   

=================================================================

Datetime: 2017 12:33

=================================================================

   

   

=================================================================

Vital Signs

=================================================================

   

Stage of Pregnancy:  Labor (Makeda Sabillon RN)

   

=================================================================

Vaginal Exam

=================================================================

   

 Dilatation (cm):  8.0 (Makeda Sabillon RN)

 Effacement (%):  100 (Makeda Sabillon RN)

 Station:  1 (Makeda Sabillon RN)

 Exam by:  SHAILESH IBARRA CNM (Makeda Sabillon RN)

 Membrane Status:  Ruptured (Makeda Sabillon RN)

 Membranes Rupture Method:  Artificial (Makeda Sabillon RN)

 Amniotic Fluid Color:  Clear (Makeda Sabillon RN)

 Amniotic Fluid Amount:  Moderate (Makeda Jennifer Roulund, RN)

 Vaginal Bleeding:  Small (Makeda Sabillon, RN)

 Cervix, Consistency:  Soft (Makeda Sabillon, RN)

 Cervix, Position:  Anterior (Makeda Sabillon, RN)

 Provider Reviewed Strip:  Yes (Makeda Jennifer Sabillon, RN)

   

=================================================================

Communication

=================================================================

   

 Communication:  RN at Bedside; RN Reviewed Strip; Provider at Bedside

   (Makeda Sabillon, RN)

 Communication Comments:  CNM @ BS (Makeda Sabillon, YOLY)

   

=================================================================

Datetime: 2017 12:32

=================================================================

   

   

=================================================================

Vital Signs

=================================================================

   

Stage of Pregnancy:  Labor (Tin Abdalla RN)

 NBP Sys/Mela/Mean (mmHg):  99 (QS system process)

:  61 (QS system process)

:  74 (QS system process)

 Respirations:  18 (Tin Abdalla RN)

 LaborFlag:  Labor (QS system process)

   

=================================================================

Datetime: 2017 12:30

=================================================================

   

   

=================================================================

Vital Signs

=================================================================

   

Stage of Pregnancy:  Labor (Makeda Sabillon RN)

 Respirations:  18 (Makeda Sabillon RN)

 Temperature (F):  98.0 (Makeda Sabillon RN)

 Temperature (C):  36.7 (QS system process)

   

=================================================================

Uterine Activity

=================================================================

   

 Monitor Mode:  External (Makeda Jennifer Roulund, RN)

 Frequency (min):  IRREG (Makeda Jennifer Roulund, RN)

 Quality:  Moderate (Makeda Jennifer Roulund, RN)

 Duration (sec):  60-90 (Makeda Jennifer Roulund, RN)

 Resting Tone (Palpate):  Relaxed (Makeda Jennifer Roulund, RN)

   

=================================================================

Fetal Assessment A

=================================================================

   

 Monitor Mode:  External US (Makeda Jennifer Roulund, RN)

 FHR Baseline Rate :  120 (Makeda Jennifer Roulund, RN)

 FHR Baseline Changes:  No Baseline Change (Makeda Jennifer Roulund, RN)

 Variability:  Moderate 6-25 bpm (Makeda Jennifer Roulund, RN)

 Accelerations:  15X15 (Makeda Jennifer Roulund, RN)

 Decelerations:  None (Makeda Jennifer Roulund, RN)

   

=================================================================

Pain

=================================================================

   

 Pain Scale:  0 (Makeda Jennifer Roulund, RN)

 Pain Presence:  None/Denies (Makeda Sabillon RN)

 Pain Type:  N/A (Makeda Sabillon RN)

 Pain Relief Measures:  Comfort Measures (Makeda Sabillon RN)

 Pain Coping:  Talking Through Contractions (Makeda Sabillon RN)

   

=================================================================

Patient Care

=================================================================

   

 IV/Blood Work:  IV Infusing per Order (Makeda Sabillon RN)

 Patient Position/Activity:  Left Extreme; Peanut Ball (Makeda Sabillon RN)

 Comfort Measures:  Family Support (Makeda Sabillon RN)

   

=================================================================

Communication

=================================================================

   

 Communication:  RN at Bedside; RN Reviewed Strip (Makeda Sabillon RN)

 LaborFlag:  Labor (QS system process)

   

=================================================================

Datetime: 2017 12:17

=================================================================

   

   

=================================================================

Vital Signs

=================================================================

   

Stage of Pregnancy:  Labor (Tin Abdalla, YOLY)

 NBP Sys/Mela/Mean (mmHg):  102 (QS system process)

:  59 (QS system process)

:  78 (QS system process)

 Pulse:  52 (QS system process)

 Respirations:  18 (Tin Abdalla, YOLY)

 LaborFlag:  Labor (QS system process)

   

=================================================================

Datetime: 2017 12:15

=================================================================

   

 Temperature (F):  98.0 (Tin Abdalla RN)

 Temperature (C):  36.7 (QS system process)

 Temperature Route:  Oral (Tin Abdalla, RN)

   

=================================================================

Uterine Activity

=================================================================

   

 Monitor Mode:  External (Tin Rm, RN)

 Frequency (min):  Irregular  (Tin Rm, RN)

 Quality:  Mild/Moderate (Tin Rm, RN)

 Resting Tone (Palpate):  Relaxed (Tin Rm, RN)

   

=================================================================

Fetal Assessment A

=================================================================

   

 Monitor Mode:  External US (Tin Rm, RN)

 FHR Baseline Rate :  125 (Tin Rm, RN)

 Variability:  Moderate 6-25 bpm (Tin Rm, RN)

 Accelerations:  15X15 (Tin Rm, RN)

 Decelerations:  None (Tin Rm, RN)

 Patient Position/Activity:  HOB Lowered; Left Lateral; Peanut Ball

   (Tin Rm, RN)

 LaborFlag:  Labor (QS system process)

   

=================================================================

Datetime: 2017 12:02

=================================================================

   

   

=================================================================

Vital Signs

=================================================================

   

Stage of Pregnancy:  Labor (Tin Abdalla, RN)

 NBP Sys/Mela/Mean (mmHg):  96 (QS system process)

:  51 (QS system process)

:  67 (QS system process)

 Pulse:  61 (QS system process)

 Respirations:  18 (Tinshailesh Abdalla, RN)

 LaborFlag:  Labor (QS system process)

   

=================================================================

Datetime: 2017 12:00

=================================================================

   

   

=================================================================

Vital Signs

=================================================================

   

Stage of Pregnancy:  Labor (Tin Rm, RN)

   

=================================================================

Uterine Activity

=================================================================

   

 Monitor Mode:  External (Tin Rm, RN)

 Frequency (min):  Irregular  (Tin Rm, RN)

 Quality:  Mild/Moderate (Tin Rm, RN)

 Resting Tone (Palpate):  Relaxed (Tin Rm, RN)

   

=================================================================

Fetal Assessment A

=================================================================

   

 Monitor Mode:  External US (Tin Rm, RN)

 FHR Baseline Rate :  120 (Tin Rm, RN)

 Variability:  Moderate 6-25 bpm (Tin Rm, RN)

 Accelerations:  15X15 (Tin Rm, RN)

 Decelerations:  None (Tin Rm, RN)

   

=================================================================

Pain

=================================================================

   

 Pain Scale:  0 (Tin Abdalla RN)

 Pain Presence:  None/Denies (Tin Abdalla RN)

 Pain Type:  N/A (Tin Abdalla RN)

 Pain Relief Measures:  Comfort Measures (Tin Abdalla, YOLY)

 Pain Coping:  Talking Through Contractions (Tin Abdalla RN)

 Pain Assessment Comments:  1 (Tin Abdalla RN)

 LaborFlag:  Labor (QS system process)

   

=================================================================

Datetime: 2017 11:47

=================================================================

   

 NBP Sys/Mela/Mean (mmHg):  96 (QS system process)

:  54 (QS system process)

:  72 (QS system process)

 Pulse:  71 (QS system process)

 LaborFlag:  Labor (QS system process)

   

=================================================================

Datetime: 2017 11:45

=================================================================

   

   

=================================================================

Uterine Activity

=================================================================

   

 Monitor Mode:  External (Tin Rm, RN)

 Frequency (min):  Irregular (Tin Rm, RN)

 Quality:  Mild (Tin Rm, RN)

 Resting Tone (Palpate):  Relaxed (Tin Rm, RN)

   

=================================================================

Fetal Assessment A

=================================================================

   

 Monitor Mode:  External US (Tin Rm, RN)

 FHR Baseline Rate :  120 (Tin Rm, RN)

 Variability:  Moderate 6-25 bpm (Tin Rm, RN)

 Accelerations:  15X15 (Tin Rm, RN)

 Decelerations:  None (Tin Rm, RN)

   

=================================================================

Datetime: 2017 11:39

=================================================================

   

 Respirations:  16 (Tin Rm, RN)

   

=================================================================

Pain

=================================================================

   

 Pain Scale:  0 (Tin Abdalla RN)

 Pain Presence:  None/Denies (Tin Abdalla RN)

 Pain Type:  N/A (Tin Abdalla RN)

 Pain Goal:  0 (Tin Abdalla RN)

 Pain Relief Measures:  Epidural Given; Comfort Measures (Tin Abdalla RN)

 Patient Position/Activity:  Right Lateral; Peanut Ball (Tin Abdalla RN)

 Comfort Measures:  Breathing/Relaxation; Family Support (Tin Abdalla RN)

 LaborFlag:  Labor (QS system process)

   

=================================================================

Datetime: 2017 11:32

=================================================================

   

 NBP Sys/Mela/Mean (mmHg):  108 (QS system process)

:  59 (QS system process)

:  78 (QS system process)

 Pulse:  67 (QS system process)

 LaborFlag:  Labor (QS system process)

   

=================================================================

Datetime: 2017 11:30

=================================================================

   

   

=================================================================

Uterine Activity

=================================================================

   

 Monitor Mode:  External (Tin Rm, RN)

 Frequency (min):  6-8 (Tin Rm, RN)

 Quality:  Mild (Tin Rm, RN)

 Duration (sec):  70-80 (Tin Rm, RN)

 Resting Tone (Palpate):  Relaxed (Tin Rm, RN)

   

=================================================================

Fetal Assessment A

=================================================================

   

 Monitor Mode:  External US (Tin Rm, RN)

 FHR Baseline Rate :  120 (Tin Rm, RN)

 Variability:  Moderate 6-25 bpm (Tin Rm, RN)

 Accelerations:  15X15 (Tin Rm, RN)

 Decelerations:  None (Tin Rm, RN)

   

=================================================================

Datetime: 2017 11:18

=================================================================

   

 NBP Sys/Mela/Mean (mmHg):  110 (QS system process)

:  58 (QS system process)

:  79 (QS system process)

 Pulse:  69 (QS system process)

 LaborFlag:  Labor (QS system process)

   

=================================================================

Datetime: 2017 11:15

=================================================================

   

   

=================================================================

Uterine Activity

=================================================================

   

 Monitor Mode:  External (Tin Abdalla RN)

 Frequency (min):  6-7 (Tin Abdalla RN)

 Quality:  Mild (Tin Abdalla RN)

 Duration (sec):   (Tin Abdalla RN)

 Resting Tone (Palpate):  Relaxed (Tin Rm, RN)

   

=================================================================

Fetal Assessment A

=================================================================

   

 Monitor Mode:  External US (Tinshailesh Abdalla, RN)

 FHR Baseline Rate :  125 (Tinshailesh Abdalla, RN)

 Variability:  Moderate 6-25 bpm (Tin Abdalla, RN)

 Accelerations:  15X15 (Tinshailesh Abdalla, RN)

 Decelerations:  None (Tin Salgadoford, RN)

   

=================================================================

Datetime: 2017 11:04

=================================================================

   

 NBP Sys/Mela/Mean (mmHg):  110 (QS system process)

:  58 (QS system process)

:  78 (QS system process)

 Pulse:  67 (QS system process)

 LaborFlag:  Labor (QS system process)

   

=================================================================

Datetime: 2017 11:00

=================================================================

   

   

=================================================================

Vital Signs

=================================================================

   

Stage of Pregnancy:  Labor (Tin Abdalla, RN)

   

=================================================================

Fetal Assessment A

=================================================================

   

 Monitor Mode:  External US (Tin Abdalla RN)

 FHR Baseline Rate :  125 (Tin Abdalla, RN)

 Variability:  Moderate 6-25 bpm (Tin Abdalla, RN)

 Accelerations:  10X10 (Tin Abdalla RN)

 Decelerations:  None (Tin Abdalla, RN)

   

=================================================================

Pain

=================================================================

   

 Pain Scale:  4 (Tin Abdalla, RN)

 Pain Presence:  Intermittent (Tin Abdalla, YOLY)

 Pain Type:  Contraction (Tin Abdalla RN)

 Pain Location:  Abdomen (Tin Abdalla, YOLY)

 Pain Relief Measures:  Comfort Measures (Tin Abdalla, YOLY)

 Pain Coping:  Breathing Through Contractions (Tin Abdalla, YOLY)

 LaborFlag:  Labor (QS system process)

   

=================================================================

Datetime: 2017 10:47

=================================================================

   

 NBP Sys/Mela/Mean (mmHg):  115 (QS system process)

:  62 (QS system process)

:  80 (QS system process)

 Pulse:  75 (QS system process)

 LaborFlag:  Labor (QS system process)

   

=================================================================

Datetime: 2017 10:45

=================================================================

   

   

=================================================================

Uterine Activity

=================================================================

   

 Monitor Mode:  External (Tin Rm, RN)

 Frequency (min):  Irregular  (Tin Rm, RN)

 Quality:  Mild (Tin Rm, RN)

 Duration (sec):  70 (Tin Rm, RN)

   

=================================================================

Datetime: 2017 10:44

=================================================================

   

   

=================================================================

Fetal Assessment A

=================================================================

   

 Monitor Mode:  External US (Tin Rm, RN)

 FHR Baseline Rate :  120 (Tin Rm, RN)

 Variability:  Moderate 6-25 bpm (Tin Rm, RN)

 Accelerations:  15X15 (Tin Rm, RN)

 Decelerations:  None (Tin Rm, RN)

   

=================================================================

Datetime: 2017 10:42

=================================================================

   

 NBP Sys/Mela/Mean (mmHg):  116 (QS system process)

:  62 (QS system process)

:  81 (QS system process)

 Pulse:  64 (QS system process)

 LaborFlag:  Labor (QS system process)

   

=================================================================

Datetime: 2017 10:36

=================================================================

   

 NBP Sys/Mela/Mean (mmHg):  118 (QS system process)

:  60 (QS system process)

:  83 (QS system process)

 Pulse:  64 (QS system process)

 LaborFlag:  Labor (QS system process)

   

=================================================================

Datetime: 2017 10:35

=================================================================

   

 NBP Sys/Mela/Mean (mmHg):  120 (QS system process)

:  62 (QS system process)

:  83 (QS system process)

 Pulse:  63 (QS system process)

 LaborFlag:  Labor (QS system process)

   

=================================================================

Datetime: 2017 10:34

=================================================================

   

 NBP Sys/Mela/Mean (mmHg):  125 (QS system process)

:  62 (QS system process)

:  86 (QS system process)

 Pulse:  75 (QS system process)

 I/O Interventions:  Ibarra Cath Inserted (Tin Abdalla RN)

 LaborFlag:  Labor (QS system process)

   

=================================================================

Datetime: 2017 10:33

=================================================================

   

 NBP Sys/Mela/Mean (mmHg):  119 (QS system process)

:  59 (QS system process)

:  82 (QS system process)

 Pulse:  76 (QS system process)

 LaborFlag:  Labor (QS system process)

   

=================================================================

Datetime: 2017 10:32

=================================================================

   

 NBP Sys/Mela/Mean (mmHg):  118 (QS system process)

:  60 (QS system process)

:  82 (QS system process)

:  84 (QS system process)

 Pulse:  65 (QS system process)

 Pulse:  73 (QS system process)

   

=================================================================

Procedure TIME OUT

=================================================================

   

 Procedure Type:  Epidural  (Tin Abdalla RN)

 Epidural Procedure Other:  Pump Started (Tin Abdalla RN)

 Anesthesia Level Check:  T10- Umbilicus (Tin Abdalla RN)

 LaborFlag:  Labor (QS system process)

   

=================================================================

Datetime: 2017 10:31

=================================================================

   

 NBP Sys/Mela/Mean (mmHg):  125 (QS system process)

:  60 (QS system process)

:  87 (QS system process)

 Pulse:  69 (QS system process)

 LaborFlag:  Labor (QS system process)

   

=================================================================

Datetime: 2017 10:30

=================================================================

   

   

=================================================================

Fetal Assessment A

=================================================================

   

 Monitor Mode:  External US (Tin Abdalla, RN)

 FHR Baseline Rate :  125 (Tin Abdalla, RN)

 Variability:  Moderate 6-25 bpm (Tin Rm, RN)

 Accelerations:  15X15 (Tin Salgadoford, RN)

   

=================================================================

Datetime: 2017 10:29

=================================================================

   

 NBP Sys/Mela/Mean (mmHg):  133 (QS system process)

:  72 (QS system process)

:  93 (QS system process)

 Pulse:  78 (QS system process)

 Contraction Comments:  Difficult to determine pts contraction pattern

   due to patients positioning for epidural (Tin Abdalla RN)

 LaborFlag:  Labor (QS system process)

   

=================================================================

Datetime: 2017 10:28

=================================================================

   

 NBP Sys/Mela/Mean (mmHg):  123 (QS system process)

:  67 (QS system process)

:  89 (QS system process)

 Pulse:  75 (QS system process)

 LaborFlag:  Labor (QS system process)

   

=================================================================

Datetime: 2017 10:27

=================================================================

   

 NBP Sys/Mela/Mean (mmHg):  137 (QS system process)

:  77 (QS system process)

:  101 (QS system process)

 Pulse:  64 (QS system process)

 LaborFlag:  Labor (QS system process)

   

=================================================================

Datetime: 2017 10:26

=================================================================

   

 NBP Sys/Mela/Mean (mmHg):  138 (QS system process)

:  76 (QS system process)

:  102 (QS system process)

 Pulse:  57 (QS system process)

 Epidural Procedure:  Loading Dose (Tin Salgadoford, RN)

 LaborFlag:  Labor (QS system process)

   

=================================================================

Datetime: 2017 10:25

=================================================================

   

 Epidural Procedure:  Cath Placed (Tin Salgadoford, RN)

 Epidural Procedure:  Test Dose (Tin Salgadoford, RN)

   

=================================================================

Datetime: 2017 10:18

=================================================================

   

   

=================================================================

Procedure TIME OUT

=================================================================

   

 Procedure Type:  Epidural  (Tin Abdalla, RN)

 Procedure Verify:  Correct Patient Identity; Agreement on Procedure to

   be Done; Correct Patient Position; Relevant Images and Results are

   Properly Labeled and Displayed (Tin Abdalla, YOLY)

   

=================================================================

Anesthesia

=================================================================

   

 Anesthesia Plans:  Epidural (Tin Abdalla, RN)

 Epidural Positioning:  Sitting (Tin Abdalla, RN)

 Anesthesia Comments:  Dr. Xie at bedside  (Tin Abdalla, RN)

   

=================================================================

Datetime: 2017 10:04

=================================================================

   

 NBP Sys/Mela/Mean (mmHg):  106 (QS system process)

:  56 (QS system process)

:  78 (QS system process)

 Pulse:  64 (QS system process)

 LaborFlag:  Labor (QS system process)

   

=================================================================

Datetime: 2017 10:00

=================================================================

   

   

=================================================================

Vital Signs

=================================================================

   

Stage of Pregnancy:  Labor (Tin Abdalla, RN)

   

=================================================================

Uterine Activity

=================================================================

   

 Monitor Mode:  External (Tin Abdalla RN)

 Frequency (min):  7 (Tin Abdalla RN)

 Quality:  Mild (Tin Abdalla RN)

 Duration (sec):   (Tin Abdalla RN)

 Resting Tone (Palpate):  Relaxed (Tin Abdalla RN)

   

=================================================================

Fetal Assessment A

=================================================================

   

 Monitor Mode:  External US (Tin Rm, RN)

 FHR Baseline Rate :  125 (Tin Rm, RN)

 Variability:  Moderate 6-25 bpm (Tin Rm, RN)

 Accelerations:  15X15 (Tin Rm, RN)

 Decelerations:  None (Tin Rm, RN)

   

=================================================================

Pain

=================================================================

   

 Pain Scale:  4 (Tin Rm, RN)

 Pain Presence:  Intermittent (Tin Rm, RN)

 Pain Type:  Contraction (Tin Rm, RN)

 Pain Location:  Abdomen (Tin Rm, RN)

 Pain Relief Measures:  Comfort Measures (Tin Rm, RN)

 Pain Coping:  Breathing Through Contractions (Tin Rm, RN)

   

=================================================================

Procedure TIME OUT

=================================================================

   

 Procedure Type:  Epidural  (Tni Abdalla RN)

 Procedure Verify:  Correct Patient Identity; Agreement on Procedure to

   be Done; Relevant Images and Results are Properly Labeled and

   Displayed (Tin Abdalla RN)

 LaborFlag:  Labor (QS system process)

   

=================================================================

Datetime: 2017 09:30

=================================================================

   

   

=================================================================

Uterine Activity

=================================================================

   

 Monitor Mode:  External (Tin Abdalla RN)

 Frequency (min):  2-6 (Tin Abdalla RN)

 Quality:  Mild (Tin Abdalla RN)

 Duration (sec):   (Tin Abdalla RN)

 Resting Tone (Palpate):  Relaxed (Tin Abdalla RN)

   

=================================================================

Fetal Assessment A

=================================================================

   

 Monitor Mode:  External US (Tinshailesh Abdalla, RN)

 FHR Baseline Rate :  135 (Tinshailesh Abdalla, RN)

 Variability:  Moderate 6-25 bpm (Tinshailesh Abdalla, RN)

 Accelerations:  15X15 (Tin Rm, RN)

 Decelerations:  None (Tin Salgadoford, RN)

   

=================================================================

Datetime: 2017 09:00

=================================================================

   

   

=================================================================

Vital Signs

=================================================================

   

Stage of Pregnancy:  Labor (Tin Salgadoford, RN)

   

=================================================================

Uterine Activity

=================================================================

   

 Monitor Mode:  External; Palpation (Makeda Sabillon RN)

 Frequency (min):  Irregular and mild  (Tin Abdalla RN)

 Quality:  Moderate (Makeda Sabillon RN)

 Duration (sec):  IRREG (Makeda Sabillon RN)

 Resting Tone (Palpate):  Relaxed (Tin Abdalla RN)

   

=================================================================

Fetal Assessment A

=================================================================

   

 Monitor Mode:  External US (Tin Abdalla RN)

   

=================================================================

Pain

=================================================================

   

 Pain Scale:  4 (Tin Abdalla RN)

 Pain Presence:  Intermittent (Tin Abdalla RN)

 Pain Type:  Contraction (Tin Abdalla RN)

 Pain Location:  Abdomen; Back (Tin Abdalla RN)

 Pain Goal:  1 (Tin Abdalla RN)

 Pain Relief Measures:  Comfort Measures (Makeda Sabillon RN)

 Pain Coping:  Breathing Through Contractions; Requesting Pain

   Medication or Epidural (Makeda Sabillon RN)

   

=================================================================

Vaginal Exam

=================================================================

   

 Dilatation (cm):  6.5 (Makeda Sabillon RN)

 Effacement (%):  90 (Tin Abdalla RN)

 Station:  0 (Tin Abdalla RN)

 Exam by:  SHAILESH IBARRA CNM (Makeda Sabillon RN)

 Membrane Status:  Intact (Tin Abdalla RN)

 Vaginal Bleeding:  Scant (Tin Abdalla RN)

   

=================================================================

Emery's Score

=================================================================

   

 Dilatation (cm):  5 or greater cms (Makeda Sabillon RN)

 Effacement:  >80_ effaced (Makeda Sabillon RN)

 Station:  minus 1 to 0 (Makeda Sabillon RN)

 Consistency:  Soft (Makeda Sabillon RN)

 Position:  Midposition (Makeda Sabillon RN)

 Total Emery's Score:  11 (QS system process)

:  9-14 = Usually no failure for induction (QS system process)

   

=================================================================

Maternal Assessment

=================================================================

   

 Level of Consciousness:  Fully Conscious (Tin Abdalla, YOLY)

 Headache:  Denies (Tin Abdalla, RN)

 Breath Sounds, Left:  Clear and Equal (Makeda Sabillon, RN)

 Breath Sounds, Right:  Clear and Equal (Makeda Sabillon, RN)

 Nausea/Vomiting:  Denies (Tin Abdalla, YOLY)

 RUQ Epigastric Pain:  Denies (Tin Rm, YOLY)

   

=================================================================

Medications

=================================================================

   

 Antibiotics:  Penicillin IV (Units) @ (Annotations: [id number] units)

   (Tin Abdalla, RN)

   

=================================================================

Patient Care

=================================================================

   

 IV/Blood Work:  IV Started; IV Bolus Started; New IV Bag Hung (Makeda Sabillon RN)

 Patient Position/Activity:  HOB Lowered; Right Lateral (Tin Abdalla RN)

 Comfort Measures:  Family Support (Makeda Sabillon RN)

 I/O Interventions:  Clear Liquids Given; Up to BR (Makeda Sabillon RN)

   

=================================================================

Procedure TIME OUT

=================================================================

   

 Procedure Type:  Epidural  (Tin Abdalla RN)

 Procedure Verify:  Correct Patient Identity; Agreement on Procedure to

   be Done; Relevant Images and Results are Properly Labeled and

   Displayed (Tin Abdalla RN)

   

=================================================================

Teaching

=================================================================

   

 Instructional Method:  Verbal; Patient Instructed; Family/Support

   Person Instructed; Verbalized Understanding (Makeda Sabillon RN)

 Plan of Care:  Plan of Care Discussed; Labor (Makeda Sabillon RN)

 Unit Routine:  Center Line to Room; Call Moeller; Bed; Visiting Policy;

   Phone/Cell Phone Use; Unit Personnel; Handwashing; Fetal Monitoring;

   Safety/Fall Risk Prevention; Diet/Nutrition Services; Bathroom

   Privileges (Makeda Sabillon RN)

 Labor/Induction:  Labor Stages (Makeda Sabillon RN)

 Pain Management:  Epidural; Pain Scale/Goals; Comfort Measures

   (Makeda Sabillon RN)

 Medications:  Antibiotics (Makeda Sabillon RN)

 Pregnancy Related:  Common Discomforts of Pregnancy; Maternal Physical

   Changes; Maternal Emotional Changes; Nutrition; Hydration; Activity

   and Rest (Makeda Sabillon RN)

 LaborFlag:  Labor (QS system process)

   

=================================================================

Datetime: 2017 08:56

=================================================================

   

 NBP Sys/Mela/Mean (mmHg):  107 (QS system process)

:  69 (QS system process)

:  80 (QS system process)

 Pulse:  104 (QS system process)

   

=================================================================

Datetime: 2017 08:45

=================================================================

   

   

=================================================================

Vaginal Exam

=================================================================

   

 Dilatation (cm):  6.5 (Tin Abdalla RN)

 Effacement (%):  90 (Tin Abdalla RN)

 Station:  0 (Tin Abdalla RN)

 Exam by:  PHI Ibarra CNM (Tin Abdalla RN)

 Vaginal Bleeding:  None (Tin Abdalla RN)

 Cervix, Consistency:  Soft (Tin Abdalla RN)

 Cervix, Position:  Midposition (Tin Abdalla RN)

## 2017-03-09 NOTE — ADMISSION PHYSICAL
=================================================================



=================================================================

Datetime Report Generated by CPN: 2017 17:06

   

   

=================================================================

CURRENT ADMISSION

=================================================================

   

Prenatal Hx Assessment:  The Prenatal History has been Reviewed and is

   Current

Chief Complaint:  Uterine Contractions

Indication for Induction:  Not Applicable

Admit Impression- Other:  GBS positive

Admit Plan:  Admit to Unit; Initiate Labor Protocol

   

=================================================================

ALLERGIES

=================================================================

   

Medication Allergies:  No

Medication Allergies:  No Known Allergies (2017)

Medication Allergies:  No Known Allergies (10/07/2016)

Latex:  No Latex Allergies

Food Allergies:  None 

Environmental Allergies:  None

   

=================================================================

OBSTETRICAL HISTORY

=================================================================

   

EDC:  2017 00:00

:  4

Para:  3

Term:  3

:  0

SAB:  0

Ectopic:  0

Living:  3

Cesareans:  0

VBACs:  0

Multiple Births:  0

Gestational Diabetes:  No

Rh Sensitization:  No

Incompetent Cervix:  No

JOSSE:  No

Infertility:  No

ART Treatment:  No

Uterine Anomaly:  No

IUGR:  No

Hx Previous C/S:  No

Macrosomia:  No

Hx Loss/Stillborn:  No

PIH:  No

Hx  Death:  No

Placenta Previa/Abruption:  No

Depression/PP Depression:  Yes

PTL/PROM:  No

Post Partum Hemorrhage:  No

Current Pregnancy Procedures:  Ultrasound; NST

Obstetrical History Comments:  PPD AFTER 1ST PREGNANCY

   

=================================================================

***SEE PRENATAL RECORDS***

=================================================================

   

Alcohol:  Yes

Advised to Stop:  Yes

Alcohol Comments:  As a teenager 

Marijuana :  Yes

Marijuana Comments:  As a teenager 

Cocaine:  No

Other Illicit Drugs:  No

Cigarettes:  Current Everyday Smoker. [id number]

Cigarette Frequency:  5 - 10 per day

Advised to Stop:  Yes

   

=================================================================

MEDICAL HISTORY

=================================================================

   

Diabetes:  No

Blood Transfusion:  No

Pulmonary Disease (Asthma, TB):  No

Breast Disease:  No

Hypertension:  No

Gyn Surgery:  No

Heart Disease:  No

Hosp/Surgery:  Yes

Autoimmune Disorder:  No

Anesthetic Complications:  No

Kidney Disease:  No

Abnormal Pap Smear:  No

Neuro/Epilepsy:  No

Psychiatric Disorders:  No

Other Medical Diseases:  No

Hepatitis/Liver Disease:  No

Significant Family History:  No

Varicosities/Phlebitis:  No

Trauma/Violence :  No

Thyroid Dysfunction:  No

Medical History Comments:  CHILDBIRTH

   

=================================================================

INFECTIOUS HISTORY

=================================================================

   

Gonorrhea:  No

Genital Herpes:  No

Chlamydia:  No

Tuberculosis:  No

Syphilis:  No

Hepatitis:  No

HIV/AIDS Exposure:  No

Rash or Viral Illness:  No

HPV:  No

   

=================================================================

PHYSICAL EXAM

=================================================================

   

General:  Normal

HEENT:  Normal

Neurologic:  Normal

Thyroid:  Deferred

Heart:  Normal

Lungs:  Normal

Breast:  Normal

Back:  Normal

Abdomen:  Normal

Genitourinary Exam:  Normal

Extremities:  Normal

DTRs:  Normal

Pelvic Type:  Adequate

Vital Signs:  Reviewed; Within Normal Limits

   

=================================================================

VAGINAL EXAM

=================================================================

   

Dilatation:  8

Dilatation:  7

Effacement:  100

Effacement:  90

Station:  1

Station:  0

Contraction Comments:  irregular

Contraction Comments:  2-6

   

=================================================================

MEMBRANES

=================================================================

   

Membranes:  Ruptured

Membranes:  Intact

Amniotic Fluid Color:  Clear

   

=================================================================

FETUS A

=================================================================

   

EGA:  40.1

Monitoring:  External US

FHR- Baseline:  125

Variability:  Moderate 6-25bpm

Accelerations:  15X15

Decelerations:  None

FHR Category:  Category I

Estimated Fetal Weight (gm):  3400

Fetal Presentation:  Vertex

Admit Comment:  Active labor

   GBS positive, pcn

   Pt may have epidural

   Anticipate 

   

=================================================================

PLANS FOR LABOR AND DELIVERY

=================================================================

   

Pain Management:  Epidural

Feeding Preference:  Both

Benefit of Breast Feed Discussed:  Yes

Circumcision:  N/A

   

=================================================================

INFORMED CONSENT

=================================================================

   

Informed Consent Obtained:  Vaginal Delivery

Informed Consent Obtained:  Vaginal Delivery

Assignment:  Mariely Hernandez MD

Signature:  Electronically signed by Manisha Ibarra CNM on 3/9/2017 at

   10:41  with User ID: [email or web address]

:  Electronically signed by Manisha Ibarra CNM on 3/9/2017 at 10:41  with

   User ID: [id number]

## 2017-03-10 VITALS — DIASTOLIC BLOOD PRESSURE: 61 MMHG | SYSTOLIC BLOOD PRESSURE: 126 MMHG

## 2017-03-10 LAB
ERYTHROCYTE [DISTWIDTH] IN BLOOD BY AUTOMATED COUNT: 13.4 % (ref 11.5–14)
HCT VFR BLD CALC: 31.9 % (ref 36–47)
HGB BLD-MCNC: 11.1 G/DL (ref 12–15.5)
HGB HCT DIFFERENCE: 1.4
MCH RBC QN AUTO: 29.7 PG (ref 27–33.4)
MCHC RBC AUTO-ENTMCNC: 34.7 G/DL (ref 32–36)
MCV RBC AUTO: 86 FL (ref 80–97)
RBC # BLD AUTO: 3.73 10^6/UL (ref 3.72–5.28)
WBC # BLD AUTO: 11 10^3/UL (ref 4–10.5)

## 2017-03-10 RX ADMIN — FERROUS SULFATE TAB 325 MG (65 MG ELEMENTAL FE) SCH MG: 325 (65 FE) TAB at 11:10

## 2017-03-10 RX ADMIN — IBUPROFEN SCH MG: 800 TABLET, FILM COATED ORAL at 05:26

## 2017-03-10 RX ADMIN — IBUPROFEN SCH MG: 800 TABLET, FILM COATED ORAL at 14:47

## 2017-03-10 RX ADMIN — DOCUSATE SODIUM SCH MG: 100 CAPSULE, LIQUID FILLED ORAL at 11:10

## 2017-03-10 NOTE — L&D GENERAL ADMISSION
=================================================================

General Admit

=================================================================

Datetime Report Generated by CPN: 03/10/2017 06:00

   

   

=================================================================

PREGNANCY INFORMATION

=================================================================

   

Patient Age:  22    (2017 08:24:QS system process)

EDC:  2017 00:00    (2017 08:59:Violet Pa RNC)

:  4    (2017 08:59:Violet Pa RNC)

Para:  3    (2017 08:59:Violet Pa RNC)

Term:  3    (2017 08:59:Violet Pa RNC)

:  0    (2017 08:59:Violet Pa RNC)

Spontaneous Abortions:  0    (2017 08:59:Violet Pa RNC)

Living:  3    (2017 08:59:Violet Pa RNC)

Cesareans:  0    (2017 08:59:Violet Pa RNC)

VBACs:  0    (2017 08:59:Violet Pa RNC)

Ectopic:  0    (2017 08:59:Violet Pa RNC)

[facility]:  [facility]

Baby, Number in Womb:  1    (2017 08:59:[facility])

   

=================================================================

PRENATAL CARE

=================================================================

   

[facility]  [facility]    ([facility]

   08:[facility]

Month of 1st Prenatal Visit:  August    (2017 08:59:[facility],

   [facility])

Adequate Prenatal Care:  Yes    (2017 08:59[facility]

Prepregnancy Weight (lb):  160    (2017 08:59:[facility], [facility])

Prepregnancy Weight  (kg):  72.7    (2017 08:59:QS system process)

Height (in):  63    (2017 18:55:QS system process)

   

=================================================================

ALLERGIES

=================================================================

   

Medication Allergy:  No    (2017 08:59:Violet Pa, [facility])

Medication Allergies:  No Known Allergies (2017)    (2017

   08:56:QS system process)

Latex Allergy:  No Latex Allergies    (2017 08:59:CODIE Baldwin)

Food Allergies:  None     (2017 08:59:Tin Abdalla RN)

Environmental Allergies:  None    (2017 08:59:Tin Abdalla RN)

   

=================================================================

COMMUNICATION

=================================================================

   

Primary Language:  English    (2017 08:59:Violet Pa [facility])

Medical Tx Preferred Language:  English    (2017 08:59:Violet Pa [facility])

Communication Barrier(s):  None    (2017 08:59:Violet Pa, RN)

   

=================================================================

DEMOGRAPHICS

=================================================================

   

Address:  [address] [place] [address]   [id number]    (2017 08:24:QS system process)

Zipcode:  [address]    (2017 08:24:QS system process)

Home Telephone:  [phone]    (2017 08:24:QS system process)

SSN:      (2017 08:24:QS system process)

Next of Kin Name:  AVELINO BECKWITH    (2017 08:24:QS system

   process)

Next of Kin Phone:  [phone]    (2017 08:24:QS system

   process)

Next of Kin Relationship:  OR    (2017 08:24:QS system process)

YOB: 1995    (2017 08:24:QS system process)

Marital Status:      (2017 13:00:QS system process)

Sex:  Female    (2017 08:24:QS system process)

Occupation:  Other    (2017 08:59:Tin Abdalla RN)

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

Race:      (2017 08:24:QS system process)

Ethnicity:  Non- or     (2017 08:24:QS system

   process)

Restorationism:  Restorationism    (2017 08:24:QS system process)

FOB Involved:  Yes    (2017 08:59:Tin Abdalla RN)

Father of Baby Name:  Avelino Beckwith    (2017 08:59:Tin Abdalla RN)

   

=================================================================

DRUG AND ALCOHOL USE

=================================================================

   

Alcohol:  Yes    (2017 08:59:Tin Abdalla RN)

Advised to Stop Alcohol:  Yes    (2017 08:59:Tin Abdalla RN)

Alcohol Comments:  As a teenager     (2017 08:59:Tin Abdalla RN)

Cigarettes:  Current Everyday Smoker. [id number]    (2017

   08:59:Violet Camp, RNC)

Average Cigarettes Smoked:  5 - 10 per day    (2017 08:59:Tin Abdalla RN)

Advised to Stop Smoking:  Yes    (2017 08:59:Tin Abdalla RN)

Marijuana:  Yes    (2017 08:59:Tin Abdalla RN)

Marijuana Comments:  As a teenager     (2017 08:59:Tin Abdalla RN)

Cocaine:  No    (2017 08:59:Tin Abdalla RN)

Other Illicit Drugs:  No    (2017 08:59:Tin Abdalla RN)

   

=================================================================

VACCINE HISTORY

=================================================================

   

Influenza Vaccine:  Uncertain    (2017 08:59:Tin Abdalla RN)

Pneumococcal Vaccine:  No    (2017 08:59:Tin Abdalla RN)

Tetanus Vaccine:  No    (2017 08:59:Tin Abdalla RN)

Tdap Vaccine:  Uncertain    (2017 08:59:Tin Abdalla RN)

Hepatitis B Vaccine:  Uncertain    (2017 08:59:Tin Abdalla RN)

   

=================================================================



=================================================================

   

Pediatrician:  [facility]    (2017

   08:59:Tin Abdalla RN)

Feeding Preference:  Both    (2017 08:59:Makeda Sabillon RN)

Benefit of Breast Feed Discussed:  Yes    (2017 08:59:Tin Abdalla RN)

Circumcision:  N/A    (2017 08:59:Tin Abdalla RN)

Prenatal Classes Attended:  No    (2017 08:59:Tin Abdalla RN)

Tubal Ligation:  No    (2017 08:59:CODIE Baldwin)

Tubal Authorization Signed:  N/A    (2017 08:59:CODIE Baldwin)

 Consent:  N/A    (2017 08:59:CODIE Baldwin)

 Consent Signed:  N/A    (2017 08:59:CODIE Baldwin)

Pain Management Plans:  Epidural    (2017 08:59:Tin Abdalla RN)

Other Labor and Delivery Plans:  Withold cord clamping     (2017

   08:59:Tin Abdalla RN)

Support Person:  Avelino Beckwith    (2017 08:59:Tin Abdalla RN)

Support Person Relationship:  Significant Other    (2017

   08:59:Tin Abdalla RN)

Cultural/Spritual Practice:  No    (2017 08:59:Tin Abdalla RN)

Spir/Cult Dietary Needs:  No    (2017 08:59:Tin Abdalla RN)

   

=================================================================

LIVING SITUATION/DISCHARGE PLAN

=================================================================

   

Living Arrangements:  House    (2017 08:59:Tin Abdalla RN)

Adequate Access to::  Electric; Heat; Refrigeration; Plumbing/Running

   water; Phone; Transportation    (2017 08:59:Tin Abdalla RN)

WIC Program:  No    (2017 08:59:Tin Abdalla RN)

Discharge  Person:  Avelino Beckwith    (2017 08:59:Tin Abdalla RN)

Person to Help after Discharge:  Avelino Beckwith    (2017

   08:59:Tin Abdalla RN)

Currently Using Commun Resources:  Yes    (2017 08:59:Tin Abdalla RN)

Specify Current Resource Used:  Medicaid    (2017 08:59:Tin Abdalla RN)

Outside Agency/:  No    (2017 08:59:Tin Abdalla RN)

Car Seat for Discharge:  Yes    (2017 08:59:Tin Abdalla RN)

Adoption Requested:  No    (2017 08:59:Tin Abdalla RN)

Pt Contact w/infant Post Birth:  N/A    (2017 08:59:Tin Abdalla RN)

   

=================================================================

PRENATAL LABS

=================================================================

   

Blood Type:  A Positive    (2017 08:59:CODIE Baldwin)

Antibody Screen:  negative    (2017 08:59:CODIE Baldwin)

Hemoglobin:  10.2 L    (2017 09:09:QS system process)

Hematocrit:  29.7 L    (2017 09:09:QS system process)

MCV:  85    (2017 09:09:QS system process)

Group Beta Strep:  positive    (2017 08:59:[facility])

Gonorrhea:  Negative    (2017 08:59:[facility])

Chlamydia:  Negative    (2017 08:59:[facility])

RPR/VDRL:  Nonreactive    (2017 08:59:[facility])

HIV Exposure Test:  Negative    (2017 08:59:Makeda Sabillon RN)

Hepatitis B:  Negative    (2017 08:59:[facility])

Rubella:  Immune    (2017 08:59:[facility])

   

=================================================================

OB/PREVIOUS PREGNANCY HISTORY

=================================================================

   

BCP at Conception:  No    (2017 08:59:Tin Abdalla RN)

Previous Pregnancy Procedures:  Ultrasound    (2017 08:59:Tin Abdalla RN)

Current Pregnancy Procedures:  Ultrasound; NST    (2017

   08:59:Tin Abdalla RN)

History of Previous :  No    (2017 08:59:Tin Abdalla RN)

History of Gestational Diabetes:  No    (2017 08:59:Tin Abdalla RN)

History of PIH:  No    (2017 08:59:Tin Abdalla RN)

History of Incompetent Cervix:  No    (2017 08:59:Tin Abdalla RN)

History of Placenta Previa/Abrup:  No    (2017 08:59:Tin Abdalla RN)

History of Macrosomia:  No    (2017 08:59:Tin Abdalla RN)

History of IUGR:  No    (2017 08:59:Tin Abdalla RN)

History of Postpartum Hemorrhage:  No    (2017 08:59:Tin Abdalla RN)

History of Loss/Stillborn:  No    (2017 08:59:Tin Abdalla RN)

History of  Death:  No    (2017 08:59:Tin Abdalla RN)

History of D (Rh) Sensitization:  No    (2017 08:59:Tin Abdalla RN)

History Recurrent Loss/Stillborn:  No    (2017 08:59:Tin Abdalla RN)

History Depression/PP Depression:  Yes    (2017 08:59:Tin Abdalla RN)

History of  Uterine Anomaly/JOSSE:  No    (2017 08:59:Tin Abdalla RN)

History of Infertility:  No    (2017 08:59:Tin Abdalla RN)

History of  ART Treatment:  No    (2017 08:59:Tin Abdalla RN)

History of JOSSE:  No    (2017 08:59:Tin Abdalla RN)

Comments Obstetrical History:  PPD AFTER 1ST PREGNANCY    (2017

   08:59:Makeda Sabillon RN)

   

=================================================================

MEDICAL HISTORY

=================================================================

   

Med Hx Diabetes:  No    (2017 08:59:Tin Abdalla RN)

Med Hx Hypertension:  No    (2017 08:59:Tin Abdalla RN)

Med Hx Heart Disease:  No    (2017 08:59:Tin Abdalla RN)

Med Hx Autoimmune Disorder:  No    (2017 08:59:Tin Abdalla RN)

Med Hx Kidney Disease/UTI:  No    (2017 08:59:Tin Abdalla RN)

Med Hx Neurologic/Epilepsy:  No    (2017 08:59:Tin Abdalla RN)

Med Hx Psychiatric Disorders:  No    (2017 08:59:Tin Abdalla RN)

Med Hx Hepatitis/Liver Disease:  No    (2017 08:59:Tin Abdalla RN)

Med Hx Varicosities/Phlebitis:  No    (2017 08:59:Tin Abdalla RN)

Med Hx Thyroid Dysfunction:  No    (2017 08:59:Tin Abdalla RN)

Med Hx Trauma/Violence:  No    (2017 08:59:Tin Abdalla RN)

Med Hx Blood Transfusion:  No    (2017 08:59:Tin Abdalla RN)

Med Hx Pulmonary (Asthma,TB):  No    (2017 08:59:Tin Abdalla RN)

Med Hx Breast:  No    (2017 08:59:Tin Abdalla RN)

Med Hx GYN Surgery:  No    (2017 08:59:Tin Abdalla RN)

Med Hx Hospitalization/Surgery:  Yes    (2017 08:59:Makeda Sabillon RN)

Med Hx Anesthetic Complications:  No    (2017 08:59:Tin Abdalla RN)

Med Hx Abnormal Pap Smear:  No    (2017 08:59:Tin Abdalla RN)

Other Medical Diseases:  No    (2017 08:59:Tin Abdalla RN)

Med Hx Significant Family Hx:  No    (2017 08:59:Tin Abdalla RN)

Details of Med/Surg Hx:  CHILDBIRTH    (2017 08:59:Makeda Sabillon RN)

   

=================================================================

INFECTIOUS HISTORY

=================================================================

   

Inf Hx Gonorrhea:  No    (2017 08:59:Tin Abdalla RN)

Inf Hx Chlamydia:  No    (2017 08:59:Tin Abdalla RN)

Inf Hx Syphilis:  No    (2017 08:59:Tin Abdalla RN)

Inf Hx HIV/AIDS:  No    (2017 08:59:Tin Abdalla RN)

Inf Hx Human Papilloma Virus:  No    (2017 08:59:Tin Abdalla RN)

Inf Hx Pt/Partner Genital Herpes:  No    (2017 08:59:Tin Abdalla RN)

Inf Hx Tuberculosis/Exposure:  No    (2017 08:59:Tin Abdalla RN)

Inf Hx Hepatitis B,C:  No    (2017 08:59:Tin Abdalla RN)

Inf Hx Rash or Viral Illness:  No    (2017 08:59:Tin Abdalla RN)

   

=================================================================

GENETIC HISTORY

=================================================================

   

Gen Hx Age >=35 at BLAINE:  No    (2017 08:59:Tin Abdalla RN)

Gen Hx Thalassemia:  No    (2017 08:59:Tin Abdalla RN)

Gen Hx Congenital Heart Defect:  No    (2017 08:59:Tin Abdalla RN)

Gen Hx Neural Tube Defect:  No    (2017 08:59:Tin Abdalla RN)

Gen Hx Down's Syndrome:  No    (2017 08:59:Tin Abdalla RN)

Gen Hx Delgado-Sachs:  No    (2017 08:59:Tin Abdalla RN)

Gen Hx [place]:  No    (2017 08:59:Tin Abdalla RN)

Gen Hx Familial Dysautonomia:  No    (2017 08:59:Tin Abdalla RN)

Gen Hx Sickle Cell Disease/Trait:  No    (2017 08:59:Tin Abdalla RN)

Gen Hx Hemophilia/Blood Disorder:  No    (2017 08:59:Tin Abdalla RN)

Gen Hx Muscular Dystrophy:  No    (2017 08:59:Tin Abdalla RN)

Gen Hx Cystic Fibrosis:  No    (2017 08:59:Tin Abdalla RN)

Gen Hx Huntingtons Chorea:  No    (2017 08:59:Tin Abdalla RN)

Gen Hx Mental Retardation/Autism:  No    (2017 08:59:Tin Abdalla RN)

Gen Hx Tested for Fragile X:  No    (2017 08:59:Tin Abdalla RN)

Gen Hx Other Inher/Chromosomal:  No    (2017 08:59:Tin Abdalla RN)

Gen Hx Maternal Metabolic DO:  No    (2017 08:59:Tin Abdalla RN)

Gen Hx Pt Father or FOB Defect:  No    (2017 08:59:Tin Abdalla RN)

Gen Hx Other Genetic History:  No    (2017 08:59:Tin Abdalla RN)

Gen Hx Drugs/Meds since LMP:  No    (2017 08:59:Tin Abdalla RN)

## 2017-03-10 NOTE — L&D CARE PLAN
=================================================================

LD CARE PLANS

=================================================================

Datetime Report Generated by CPN: 03/10/2017 06:15

   

   

=================================================================

Datetime: 2017 09:11

=================================================================

   

   

=================================================================

Pain

=================================================================

   

 State:  Actual (Violet Camp, RNC)

 Related To:  Labor and Delivery Process; Treatment and Procedures;

   Post Partum (Violet Camp, RNC)

 Goal(s):  Patients Pain will be Assessed and Managed; Patient will

   Verbalize Adequate Relief of Pain or the Ability to [place] with

   Current Pain (Violet Camp, RNC)

 Interventions:  Assess Pain Severity on Scale of 0 (None) to 5

   (Severe); Assess Type, Location and Intensity of Pain Each Time

   Client Reports Discomfort and Notify Provider if Unusal Pain

   Develops; Encourage Proper Breathing and Relaxation Techniques;

   Offer Alternatives Such as Repositioning, Calm Environment,

   Massages, Diversional Activities, Ice Pack, Splinting, and

   Ambulation; Administer Analgesics as Ordered; Assist with Epidural

   Placement as Appropriate; Evaluate Therapeutic Effectiveness of

   Medication and Treatments (Violet Pa, CODIE)

 Outcome:  Patient will Report Absence or Relief of Pain Consistent

   with Established Pain Goal (CODIE Baldwin)

   Status:  Ongoing (CODIE Baldwin)

 Outcome:  Patient will have a Decrease in Signs and Symptoms of

   Discomfort (Violet Pa, RNC)

   Status:  Ongoing (CODIE Baldwin)

 Outcome:  Pain will be Controlled During Procedures (CODIE Baldwin)

   Status:  Ongoing (CODIE Baldwin)

   

=================================================================

Anxiety

=================================================================

   

 State:  Risk For (Violet Pa, CODIE)

 Related To:  Labor and Delivery Process; Fear of Unknown; Situational

   Crisis; Medical Interventions; Significant Life Event (CODIE Baldwin)

 Goal(s):  Patient will have Decreased Anxiety and be able to Function

   at Acceptable Levels (Violet Pa, CODIE)

 Interventions:  Assess Verbal and Nonverbal  Behavioral Indicators of

   Anxiety; Assist Patient to Identify and Verbalize Symptoms of

   Anxiety; Identify and Demonstrate Techniques to Control Anxiety;

   Assist Patient with Coping Mechanisms to Manage Anxiety; Provide

   Theraputic Touch for the Patient; Explain to Patient, Using a Calm

   Reassuring Approach and Nonmedical Terms, All Activities,

   Procedures, and Concerns; Instruct Patient and Family about Post

   Discharge Care, Limitations, Symptoms to Report and Resources

   Available (Violet Pa, CODIE)

 Outcome:  Patient will Identify, Verbalize and Demonstrate Techniques

   to Control Anxiety (CODIE Baldwin)

   Status:  Ongoing (Violet Pa, [facility])

 Outcome:  Patient's Posture, Facial Expressions, Gestures and Activity

   Level will Reflect Decreased Anxiety (CODIE Baldwin)

   Status:  Ongoing (Violet Pa, RN)

 Outcome:  Patient will Verbalize a Sense of Control and/or Acceptance

   of the Situation (Violet Pa, RNC)

   Status:  Ongoing (Violet Pa, RN)

 Outcome:  Patient will Identify and Utilize Support Person (Violet Pa RN)

   Status:  Ongoing (Violet Pa [facility])

   

=================================================================

Knowledge Deficit

=================================================================

   

 State:  Actual (CODIE Baldwin)

 Related To:  Labor and Delivery Process; Treatment and Procedures;

   Feeding and Infant Care; Community Resources and Available Support

   Mechanisms (CODIE Baldwin)

 Goal(s):  Patient will Accurately Verbalize Understanding of Plan of

   Care and Treatment; Patient and Family will Accurately Verbalize

   Understanding of the Disease Process (CODIE Baldwin)

 Interventions:  Assess Motivation and Willingness of Patient/Family to

   Learn; Assess Preferred Learning Mode: One to One Instruction,

   Reading, Videos, Group Discussion or Demonstration; Assess Barriers

   to Learning: Pain, Emotional State, Language Barrier, Cognitive

   Impairment, Visual or Hearing Deficits; Assess Patient and Family

   Knowledge of Disease Process, Medications and Treatment; Discuss

   Therapy and/or Treatment Options, Describe Rationale Behind

   Management, Therapy and Treatment Recommendations; Instruct Patient

   and Family on Signs and Symptoms to Report; Instruct Patient and

   Family on Medication Effects and Side Effects; Provide Appropriate

   and Timely Education Using Multiple Techniques; Provide Patient and

   Family with Support Group Information and Resources; Give Clear and

   Thorough Explanations and Demonstrations (CODIE Baldwin)

 Outcome:  Patient and Family will Verbalize Understanding of

   Condition, Treatment and Signs and Symptoms to Report (CODIE Baldwin)

   Status:  Ongoing (CODIE Baldwin)

 Outcome:  Patient will Identify Perceived Learning Needs and Express

   Motivation to Learn ([facility]

   Status:  Ongoing ([facility]

 Outcome:  Patient will Verbalize Understanding of Desired Content,

   and/or Performs Desired Skill Prior to Discharge ([facility])

   Status:  Ongoing ([facility], [facility])

   

=================================================================

Infection

=================================================================

   

 State:  Actual (Violet Pa, [facility])

 Related To:  Invasive Procedures (Violet Pa, [facility])

 Goal(s):  The Patient will be Free of Infection, Vital Signs Stable

   and Lab Work within Normal Parameters (Violet [place], [facility])

 Interventions:  Instruct and Reinforce Proper Handwashing, Hygiene,

   and  Care Techniques to Patient and Family; Monitor Vital

   Signs; Monitor Patient for the Following Signs of Infection: Fever,

   Abdominal Tenderness, Unusual Discharge; Monitor Aminiotic Fluid,

   Urine and Lochia for Color and Odor; Observe Wounds, Incisions and

   Invasive Line Sites for Redness, Drainage and Edema; Assess IV Sites

   per Hospital Policy; Monitor Lab and Test Results and Notify

   Provider of Abnormal Findings; Assess Nutritional Status and Promote

   Good Nutrition (Violet Pa, [facility])

 Outcome:  Patient will Remain Free of Infection (Violet [place], RN)

   Status:  Ongoing ([facility])

 Outcome:  Infection will be Recognized Early to Allow for Prompt

   Treatment (Violet Pa, [facility])

   Status:  Ongoing ([facility])

 Outcome:  Patient will have Vital Signs Within Expected Range ([facility], RNC)

   Status:  Ongoing ([facility], RNC)

   

=================================================================

Injury

=================================================================

   

 State:  Risk For (Violet Camp, RNC)

 Related To:  Labor and Delivery Process (Violet [place], RNC)

 Goal(s):  Patient will Remain Free from Injury (Violet Camp, RNC)

 Interventions:  Fetal Monitoring as per Hospital Protocol; Assess

   Neurological Status; Perform Risk Assessment of Patients with

   Induction and ; Perform Fall Risk Assessment and Prevention per

   Hospital Protocol; Perform DVT Risk Assessment and Prophylaxis per

   Hospital Protocol; Ensure that Oxygen, Suction, and Resuscitation

   Medications and Equipment are Readily Available; Confirm Patient ID

   Prior to Procedure(s) and Medication Administration per Hospital

   Policy (Violet Camp, RNC)

 Outcome:  Successful Fall Risk Prevention (Violet Camp, RNC)

   Status:  Ongoing (Violet Camp, RNC)

 Outcome:  Patient will Deliver Infant without Adverse [place]ela (Violet

   Camp, RNC)

   Status:  Ongoing (Violet Camp, RNC)

 Outcome:  Patient's Neurological Status will Remain Stable (Violet

   Camp, RNC)

   Status:  Ongoing (Violet Camp, RNC)

   

=================================================================

Impaired Skin Integrity

=================================================================

   

 State:  Risk For (Violet Camp, RNC)

 Related To:  Vaginal Delivery; Invasive Procedures (Violet Camp, RNC)

 Goal(s):  Patient will Maintain Optimal Skin Integrity, Free of

   Breakdown, Injury or Infection (Violet Camp, RNC)

 Interventions:  Complete Screening for Pressure Ulcer Risk and

   Initiate Protocol per Hospital Policy; Monitor Site of Skin

   Impairment for Color Changes, Redness, Swelling, Warmth, Pain or

   Other Signs of  Infection; Encourage and Assist with Position

   Changes; Monitor Patient's Mobility Status; Provide Adequate

   Nutrition and Fluids; Teach Patient Appropriate Hygienic Care; Teach

   Patient/Family Skin Care Management (Violet Pa, RNC)

 Outcome:  Patient will not have Evidence of Injury Such as Skin

   Breakdown, Scrapes, Cuts, or Bruising (Violet Pa, RNC)

   Status:  Ongoing (Violet Pa, RNC)

 Outcome:  Patient will Report Any Altered Sensation or Pain at Site of

   Skin Impairment (Violet Pa, RNC)

   Status:  Ongoing (Violet Pa, RNC)

 Outcome:  Patients Incisions and Wounds will be without Signs or

   Symptoms of Infection (Violet Pa, RNC)

   Status:  Ongoing (Violet Pa, RNC)

 Outcome:  Patient will Demonstrate Understanding of Plan to Heal Skin

   and Prevent Reinjury and Verbalize Risk Factors (Violet Pa, RNC)

   Status:  Ongoing (Violet Pa, [facility])

## 2017-03-10 NOTE — L&D CURRENT ADMISSION
=================================================================

Current Admit

=================================================================

Datetime Report Generated by CPN: 03/10/2017 06:00

   

   

=================================================================

ADMISSION INFORMATION

=================================================================

   

Current Admit Date/Time:  03/09/2017 08:00    (03/09/2017 09:00:Tin Abdalla RN)

Reason for Admission:  Onset of Labor    (03/09/2017 09:00:Tin Abdalla RN)

Chief Complaint:  Contractions    (03/09/2017 09:00:Tin Abdalla RN)

EGA per Dates:  40.1    (03/09/2017 09:00:QS system process)

Method of Arrival:  Wheelchair    (03/09/2017 09:00:Tin Abdalla RN)

Reason for Induction:  Not Applicable    (03/09/2017 09:00:Makeda Sabillon RN)

Prenatal Records Available:  Yes    (03/09/2017 09:00:Tin Abdalla RN)

General Admission Information:  Reviewed    (03/09/2017 09:00:Tin Abdalla RN)

   

=================================================================

BELONGINGS/ADVANCED DIRECTIVES

=================================================================

   

Valuables/Personal Effects:  Purse/Wallet; Cell Phone; Eyeglasses   

   (03/09/2017 09:00:Tin Abdalla RN)

Other Belongings:  SEE VALUABLES CONSENT    (03/09/2017 09:00:Makeda Sabillon RN)

Disposition of Belongings:  Kept with Patient    (03/09/2017

   09:00:Tin Abdalla RN)

Advance Direct for Healthcare:  No, and Wants No Information   

   (03/09/2017 09:00:Tin Abdalla RN)

Durable Power of :  No    (03/09/2017 09:00:Tin Abdalla RN)

Living Will:  No    (03/09/2017 09:00:Tin Abdalla RN)

Organ Donor:  Yes    (03/09/2017 09:00:Tin Abdalla RN)

Pt Rights Information Given:  Yes    (03/09/2017 09:00:Tin Abdalla RN)

Pt Understands Pt Rights:  Yes    (03/09/2017 09:00:Tin Abdalla RN)

   

=================================================================

LEARNING ASSESSMENT

=================================================================

   

Knowledge Level:  Understands L_D Process; Understands Care Activities;

   Had Pre-Hospital Education; Understands Diagnosis    (03/09/2017

   09:00:Tin Abdalla RN)

Barriers to Learning:  None    (03/09/2017 09:00:Tin Abdalla RN)

Learning Readiness:  Motivated    (03/09/2017 09:00:Tin Abdalla RN)

Learns Best By:  1 to 1 Instruction; Reading; Videos; Demonstration   

   (03/09/2017 09:00:Makeda Sabillon RN)

Learning Needs:  Labor and Delivery Process; Pain Management; Symptoms

   to Report; Treatment Plan; Medication; Diagnosis; Nutrition;

   Equipment; Infant Care; Community Resources    (03/09/2017

   09:00:Makeda Sabillon RN)

   

=================================================================

DOMESTIC VIOLANCE SCREENING

=================================================================

   

Dom Viol Threatened/Hurt:  No    (03/09/2017 09:00:Tin Abdalla RN)

Hx of Abuse/Neglect past 2yrs:  No    (03/09/2017 09:00:Tin Abdalla RN)

Feel Unsafe Going Home:  No    (03/09/2017 09:00:Tin Abdalla RN)

Addt'l Observ Indicating Abuse:  No    (03/09/2017 09:00:Tin Abdalla RN)

Considered Personal Harm/Suicide:  No    (03/09/2017 09:00:Tin Abdalla RN)

   

=================================================================

NUTRITIONAL/FUNCTIONAL SCREENING

=================================================================

   

Problem with Appetite >5 Days:  No    (03/09/2017 09:00:Tin Abdalla RN)

Chew/Swallow Difficulties:  No    (03/09/2017 09:00:Tin Abdalla RN)

Inappropriate Wt Gain/Loss:  No    (03/09/2017 09:00:Tin Abdalla RN)

Presence Skin Breakdown/Ulcer:  No    (03/09/2017 09:00:Tin Abdalla RN)

Special Diet:  No    (03/09/2017 09:00:Tin Abdalla RN)

Pt Requests Dietician Visit:  No    (03/09/2017 09:00:Tin Abdalla RN)

Hx of Any of the Following?:  N/A    (03/09/2017 09:00:Tin Abdalla RN)

New Diagnosis of:  N/A    (03/09/2017 09:00:Tin Abdalla RN)

Requires Assist w/Ambulation:  No    (03/09/2017 09:00:Tin Abdalla RN)

Uses Assist Device to Ambulate:  No    (03/09/2017 09:00:Tin Abdalla RN)

Pt Requires Help w/ADL's:  No    (03/09/2017 09:00:Tin Abdalla RN)

## 2017-03-10 NOTE — PDOC DISCHARGE SUMMARY
Final Diagnosis


Discharge Date: 03/10/17 - desires early discharge





- Final Diagnosis


(1)  (normal spontaneous vaginal delivery)


Is this a current diagnosis for this admission?: Yes





(2) Smoker


Is this a current diagnosis for this admission?: Yes








Discharge Data





- Discharge Medication


Home Medications: 








Pnv W-O Ca No5/Fe Fumarate/FA [Prenatal-U Multiple Vitamin Capsule] 1 cap PO 

DAILY #90 capsule 09/25/15 


Ibuprofen [Motrin 800 mg Tablet] 800 mg PO Q8HP PRN #90 tablet 03/10/17 








Reason(s) for Admission: Onset of Labor


Prenatal Procedures: Ultrasound


Intrapartum Procedure(s): Spontaneous Vaginal Delivery





-  Data


  ** Baby 1 Female


Apgar at 1 minute: 9


Apgar at 5 minutes: 9


Weight: 3475 kg


Home with Mother: Yes


Complications: No





- Diagnosis Test


Laboratory: 


 











Temp Pulse Resp BP Pulse Ox


 


 98.0 F   77   18   126/61 H  100 


 


 03/10/17 12:54  03/10/17 12:54  03/10/17 12:54  03/10/17 08:48  03/10/17 12:54








 











  03/09/17 03/09/17 03/10/17





  08:34 09:09 07:10


 


RBC   3.51 L  3.73


 


Hgb   10.2 L  11.1 L


 


Hct   29.7 L  31.9 L


 


Urine Opiates Screen  NEGATIVE  














- Discharge information/Instructions


Discharge Activity: Activity As Tolerated, Pelvic Rest, Slowly Increase Activity


Discharge Diet: Regular


Disposition: HOME, SELF-CARE


Follow up with: [facility]


in: 4, Weeks





Physical Exam (OB)


Vital Signs: 


 











Temp Pulse Resp BP Pulse Ox


 


 98.0 F   77   18   126/61 H  100 


 


 03/10/17 12:54  03/10/17 12:54  03/10/17 12:54  03/10/17 08:48  03/10/17 12:54








 Intake & Output











 03/09/17 03/10/17 03/11/17





 06:59 06:59 06:59


 


Intake Total  800 


 


Balance  800 


 


Weight  83.95 kg 














- General


General Appearance: Appears well


In distress: None





- Episiotomy/Laceration


Site Condition: N/A





- Lochia


Lochia Amount: Small 10-25 ml


Lochia Color: Rubra/Red





- Abdomen


Description: Soft, Round


Hernia Present: No


Fundal Description: Firm, Midline


Fundal Height: u/u - u/2





- Respiratory


Respiratory Status: No respiratory distress





- Extremities


Upper extremity: Normal inspection


Lower extremities: Normal inspection





- Neurological


Cognition: Normal


Orientation: AAOx4





- Psychological


Associated symptoms: Normal affect, Normal mood - helpful  at bedside. 

Desire early discharge (mother at home with the three other children and a 

puppy and losing her patience). No concerns at this time. Desire depo for 

contraception right now but planning BTL.

## 2017-12-27 ENCOUNTER — HOSPITAL ENCOUNTER (EMERGENCY)
Dept: HOSPITAL 62 - ER | Age: 22
Discharge: HOME | End: 2017-12-27
Payer: OTHER GOVERNMENT

## 2017-12-27 DIAGNOSIS — O23.01: Primary | ICD-10-CM

## 2017-12-27 DIAGNOSIS — R31.0: ICD-10-CM

## 2017-12-27 DIAGNOSIS — O26.891: ICD-10-CM

## 2017-12-27 DIAGNOSIS — Z3A.11: ICD-10-CM

## 2017-12-27 LAB
ADD MANUAL DIFF: NO
ALBUMIN SERPL-MCNC: 4.1 G/DL (ref 3.5–5)
ALP SERPL-CCNC: 55 U/L (ref 38–126)
ALT SERPL-CCNC: 18 U/L (ref 9–52)
ANION GAP SERPL CALC-SCNC: 10 MMOL/L (ref 5–19)
APPEARANCE UR: (no result)
APTT PPP: YELLOW S
AST SERPL-CCNC: 13 U/L (ref 14–36)
BASOPHILS # BLD AUTO: 0 10^3/UL (ref 0–0.2)
BASOPHILS NFR BLD AUTO: 0.5 % (ref 0–2)
BILIRUB SERPL-MCNC: < 0.1 MG/DL (ref 0.2–1.3)
BILIRUB UR QL STRIP: NEGATIVE
BUN SERPL-MCNC: 9 MG/DL (ref 7–20)
CALCIUM: 9.4 MG/DL (ref 8.4–10.2)
CHLORIDE SERPL-SCNC: 105 MMOL/L (ref 98–107)
CO2 SERPL-SCNC: 23 MMOL/L (ref 22–30)
EOSINOPHIL # BLD AUTO: 0.1 10^3/UL (ref 0–0.6)
EOSINOPHIL NFR BLD AUTO: 1.5 % (ref 0–6)
ERYTHROCYTE [DISTWIDTH] IN BLOOD BY AUTOMATED COUNT: 13.6 % (ref 11.5–14)
GLUCOSE SERPL-MCNC: 86 MG/DL (ref 75–110)
GLUCOSE UR STRIP-MCNC: NEGATIVE MG/DL
HCT VFR BLD CALC: 31.9 % (ref 36–47)
HGB BLD-MCNC: 11.1 G/DL (ref 12–15.5)
KETONES UR STRIP-MCNC: NEGATIVE MG/DL
LYMPHOCYTES # BLD AUTO: 1.6 10^3/UL (ref 0.5–4.7)
LYMPHOCYTES NFR BLD AUTO: 20.8 % (ref 13–45)
MCH RBC QN AUTO: 29.6 PG (ref 27–33.4)
MCHC RBC AUTO-ENTMCNC: 34.8 G/DL (ref 32–36)
MCV RBC AUTO: 85 FL (ref 80–97)
MONOCYTES # BLD AUTO: 0.4 10^3/UL (ref 0.1–1.4)
MONOCYTES NFR BLD AUTO: 4.7 % (ref 3–13)
NEUTROPHILS # BLD AUTO: 5.7 10^3/UL (ref 1.7–8.2)
NEUTS SEG NFR BLD AUTO: 72.5 % (ref 42–78)
NITRITE UR QL STRIP: NEGATIVE
PH UR STRIP: 6 [PH] (ref 5–9)
PLATELET # BLD: 208 10^3/UL (ref 150–450)
POTASSIUM SERPL-SCNC: 3.8 MMOL/L (ref 3.6–5)
PROT SERPL-MCNC: 6.8 G/DL (ref 6.3–8.2)
PROT UR STRIP-MCNC: 30 MG/DL
RBC # BLD AUTO: 3.75 10^6/UL (ref 3.72–5.28)
SODIUM SERPL-SCNC: 137.7 MMOL/L (ref 137–145)
SP GR UR STRIP: 1.01
TOTAL CELLS COUNTED % (AUTO): 100 %
UROBILINOGEN UR-MCNC: NEGATIVE MG/DL (ref ?–2)
WBC # BLD AUTO: 7.8 10^3/UL (ref 4–10.5)

## 2017-12-27 PROCEDURE — 81001 URINALYSIS AUTO W/SCOPE: CPT

## 2017-12-27 PROCEDURE — 85025 COMPLETE CBC W/AUTO DIFF WBC: CPT

## 2017-12-27 PROCEDURE — 80053 COMPREHEN METABOLIC PANEL: CPT

## 2017-12-27 PROCEDURE — 87086 URINE CULTURE/COLONY COUNT: CPT

## 2017-12-27 PROCEDURE — 36415 COLL VENOUS BLD VENIPUNCTURE: CPT

## 2017-12-27 PROCEDURE — 99283 EMERGENCY DEPT VISIT LOW MDM: CPT

## 2017-12-27 PROCEDURE — 87088 URINE BACTERIA CULTURE: CPT

## 2017-12-27 PROCEDURE — 87186 SC STD MICRODIL/AGAR DIL: CPT
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
## 2017-12-28 VITALS — SYSTOLIC BLOOD PRESSURE: 120 MMHG | DIASTOLIC BLOOD PRESSURE: 58 MMHG

## 2018-07-19 ENCOUNTER — HOSPITAL ENCOUNTER (INPATIENT)
Dept: HOSPITAL 62 - LR | Age: 23
LOS: 1 days | Discharge: HOME | End: 2018-07-20
Attending: OBSTETRICS & GYNECOLOGY | Admitting: OBSTETRICS & GYNECOLOGY
Payer: OTHER GOVERNMENT

## 2018-07-19 DIAGNOSIS — Z3A.40: ICD-10-CM

## 2018-07-19 DIAGNOSIS — F17.210: ICD-10-CM

## 2018-07-19 DIAGNOSIS — J45.909: ICD-10-CM

## 2018-07-19 DIAGNOSIS — O48.0: Primary | ICD-10-CM

## 2018-07-19 LAB
ADD MANUAL DIFF: NO
APPEARANCE UR: CLEAR
APTT PPP: (no result) S
BARBITURATES UR QL SCN: NEGATIVE
BASOPHILS # BLD AUTO: 0.1 10^3/UL (ref 0–0.2)
BASOPHILS NFR BLD AUTO: 0.7 % (ref 0–2)
BILIRUB UR QL STRIP: NEGATIVE
EOSINOPHIL # BLD AUTO: 0.1 10^3/UL (ref 0–0.6)
EOSINOPHIL NFR BLD AUTO: 1.4 % (ref 0–6)
ERYTHROCYTE [DISTWIDTH] IN BLOOD BY AUTOMATED COUNT: 14.8 % (ref 11.5–14)
GLUCOSE UR STRIP-MCNC: NEGATIVE MG/DL
HCT VFR BLD CALC: 29.2 % (ref 36–47)
HGB BLD-MCNC: 10.2 G/DL (ref 12–15.5)
KETONES UR STRIP-MCNC: NEGATIVE MG/DL
LYMPHOCYTES # BLD AUTO: 1.7 10^3/UL (ref 0.5–4.7)
LYMPHOCYTES NFR BLD AUTO: 24.2 % (ref 13–45)
MCH RBC QN AUTO: 29 PG (ref 27–33.4)
MCHC RBC AUTO-ENTMCNC: 35 G/DL (ref 32–36)
MCV RBC AUTO: 83 FL (ref 80–97)
METHADONE UR QL SCN: NEGATIVE
MONOCYTES # BLD AUTO: 0.2 10^3/UL (ref 0.1–1.4)
MONOCYTES NFR BLD AUTO: 3.2 % (ref 3–13)
NEUTROPHILS # BLD AUTO: 4.9 10^3/UL (ref 1.7–8.2)
NEUTS SEG NFR BLD AUTO: 70.5 % (ref 42–78)
NITRITE UR QL STRIP: NEGATIVE
PCP UR QL SCN: NEGATIVE
PH UR STRIP: 7 [PH] (ref 5–9)
PLATELET # BLD: 188 10^3/UL (ref 150–450)
PROT UR STRIP-MCNC: NEGATIVE MG/DL
RBC # BLD AUTO: 3.52 10^6/UL (ref 3.72–5.28)
SP GR UR STRIP: 1.01
TOTAL CELLS COUNTED % (AUTO): 100 %
URINE AMPHETAMINES SCREEN: NEGATIVE
URINE BENZODIAZEPINES SCREEN: NEGATIVE
URINE COCAINE SCREEN: NEGATIVE
URINE MARIJUANA (THC) SCREEN: NEGATIVE
UROBILINOGEN UR-MCNC: NEGATIVE MG/DL (ref ?–2)
WBC # BLD AUTO: 7 10^3/UL (ref 4–10.5)

## 2018-07-19 PROCEDURE — 81005 URINALYSIS: CPT

## 2018-07-19 PROCEDURE — 85025 COMPLETE CBC W/AUTO DIFF WBC: CPT

## 2018-07-19 PROCEDURE — 86900 BLOOD TYPING SEROLOGIC ABO: CPT

## 2018-07-19 PROCEDURE — 36415 COLL VENOUS BLD VENIPUNCTURE: CPT

## 2018-07-19 PROCEDURE — 86850 RBC ANTIBODY SCREEN: CPT

## 2018-07-19 PROCEDURE — 94760 N-INVAS EAR/PLS OXIMETRY 1: CPT

## 2018-07-19 PROCEDURE — 80307 DRUG TEST PRSMV CHEM ANLYZR: CPT

## 2018-07-19 PROCEDURE — 85027 COMPLETE CBC AUTOMATED: CPT

## 2018-07-19 PROCEDURE — 86592 SYPHILIS TEST NON-TREP QUAL: CPT

## 2018-07-19 PROCEDURE — 86901 BLOOD TYPING SEROLOGIC RH(D): CPT

## 2018-07-19 RX ADMIN — IBUPROFEN SCH MG: 800 TABLET, FILM COATED ORAL at 21:52

## 2018-07-19 RX ADMIN — IBUPROFEN SCH MG: 800 TABLET, FILM COATED ORAL at 11:31

## 2018-07-19 RX ADMIN — DOCUSATE SODIUM SCH MG: 100 CAPSULE, LIQUID FILLED ORAL at 17:42

## 2018-07-19 NOTE — ADMISSION PHYSICAL
=================================================================



=================================================================

Datetime Report Generated by CPN: 2018 07:48

   

   

=================================================================

CURRENT ADMISSION

=================================================================

   

Chief Complaint:  Scheduled Induction of Labor

Indication for Induction:  Post Dates

Admit Impression :  Term, Intrauterine Pregnancy; No Active Labor

Admit Plan:  Admit to Unit; Initiate Labor Induction Protocol

   

=================================================================

ALLERGIES

=================================================================

   

Medication Allergies:  No

Medication Allergies:  adhesive (2018)

   

=================================================================

OBSTETRICAL HISTORY

=================================================================

   

EDC:  2018 00:00

:  5

Para:  4

Term:  4

:  0

SAB:  0

IAB:  0

Ectopic:  0

Livin

Cesareans:  0

VBACs:  0

Multiple Births:  0

   

=================================================================

***SEE PRENATAL RECORDS***

=================================================================

   

Cigarettes:  Current Everyday Smoker. [id number]

Advised to Stop:  Yes

Cigarette Comments:  1/2 pack per day

   

=================================================================

PHYSICAL EXAM

=================================================================

   

General:  Normal

HEENT:  Normal

Neurologic:  Normal

Thyroid:  Deferred

Heart:  Normal

Lungs:  Normal

Breast:  Deferred

Back:  Normal

Abdomen:  Normal

Genitourinary Exam:  Normal

Extremities:  Normal

DTRs:  Normal

Pelvic Type:  Adequate

Vital Signs:  Reviewed

   

=================================================================

VAGINAL EXAM

=================================================================

   

Dilatation:  3

Effacement:  50

Station:  -2

Contraction Comments:  irreg

   

=================================================================

MEMBRANES

=================================================================

   

Membranes:  Intact

   

=================================================================

FETUS A

=================================================================

   

EGA:  40.5

Monitoring:  External US

FHR- Baseline:  125

Variability:  Moderate 6-25bpm

Accelerations:  15X15

Decelerations:  None

FHR Category:  Category I

Fetal Presentation:  Vertex

Admit Comment:  22yo  at 40+5ega presents for scheduled IOL. 

   H/o asthma, Fibromyalgia, h/o depression.  GBS negative.  IOL

   consents reviewed with pt in office.  Admit to L_D and begin pitocin

   for IOL.  Anticipate .

   

=================================================================

PLANS FOR LABOR AND DELIVERY

=================================================================

   

Labor and Delivery:  None

Pain Management:  Epidural

Feeding Preference:  Both

Benefit of Breast Feed Discussed:  Yes

   

=================================================================

INFORMED CONSENT

=================================================================

   

Informed Consent Obtained:  Vaginal Delivery; Induction of Labor;

   Risks, Benefits and Alternatives Discussed

Signature:  Electronically signed by MD AL Camp) on

   2018 at 07:47  with User ID: [email or web address]

## 2018-07-19 NOTE — L&D PROGRESS NOTES
=================================================================

PROGRESS NOTES

=================================================================

Datetime Report Generated by CPN: 2018 09:18

   

   

=================================================================

PROGRESS NOTE

=================================================================

   

Procedures:  Artificial ROM

Plan:  Continue Present Management; Induction

Informed Consent Obtained:  Vaginal Delivery; Risks, Benefits and

   Alternatives Discussed

Informed Consent Obtained:  Vaginal Delivery; Induction of Labor;

   Risks, Benefits and Alternatives Discussed

Comment:  22 yo  admitted for IOL

   EDC 18

   EGA 40.5

   nkda

   phx: asthma- exercise induced

   fibromyalgia

   depression- denies SI/HI

   term pregnancy

   SVE /-2 AROM clear

   abdomen soft

   pt desires epidural/ start iv bolus

   poc reviewed with pt and spouse

   anticipate vaginal delivery

      

   

=================================================================

VAGINAL EXAM

=================================================================

   

Dilatation:  4

Dilatation:  3

Effacement:  80

Effacement:  50

Station:  -2

Station:  -2

Contractions:  irreg

   

=================================================================

MEMBRANES

=================================================================

   

Membranes:  Ruptured

Membranes:  Intact

Amniotic Fluid Color:  Clear

   

=================================================================

FETUS A

=================================================================

   

FHR - Baseline:  120

Monitoring:  External US

Variability:  Moderate 6-25bpm

Accelerations:  15X15

Decelerations:  None

FHR Category:  Category I

:  40.5

Fetal Presentation:  Vertex

   

=================================================================

SIGNATURE

=================================================================

   

SIGNATURE:  [id number]

SIGNATURE:  [id number]

Assignment:  Rangel Arroyo MD

Signature:  Electronically signed by Fiordaliza Herrera CNM on 2018 at

   09:18  with User ID: [email or web address]

:  Electronically signed by Fiordaliza Herrera CNM on 2018 at 09:18 

   with User ID: [email or web address]

## 2018-07-20 VITALS — DIASTOLIC BLOOD PRESSURE: 53 MMHG | SYSTOLIC BLOOD PRESSURE: 103 MMHG

## 2018-07-20 LAB
ERYTHROCYTE [DISTWIDTH] IN BLOOD BY AUTOMATED COUNT: 14.9 % (ref 11.5–14)
HCT VFR BLD CALC: 26.1 % (ref 36–47)
HGB BLD-MCNC: 9 G/DL (ref 12–15.5)
MCH RBC QN AUTO: 28.7 PG (ref 27–33.4)
MCHC RBC AUTO-ENTMCNC: 34.4 G/DL (ref 32–36)
MCV RBC AUTO: 83 FL (ref 80–97)
PLATELET # BLD: 159 10^3/UL (ref 150–450)
RBC # BLD AUTO: 3.12 10^6/UL (ref 3.72–5.28)
WBC # BLD AUTO: 7.6 10^3/UL (ref 4–10.5)

## 2018-07-20 RX ADMIN — IBUPROFEN SCH MG: 800 TABLET, FILM COATED ORAL at 06:02

## 2018-07-20 RX ADMIN — DOCUSATE SODIUM SCH MG: 100 CAPSULE, LIQUID FILLED ORAL at 09:45

## 2018-07-20 RX ADMIN — IBUPROFEN SCH MG: 800 TABLET, FILM COATED ORAL at 13:19

## 2018-07-20 RX ADMIN — DOCUSATE SODIUM SCH MG: 100 CAPSULE, LIQUID FILLED ORAL at 17:22

## 2018-07-20 NOTE — PDOC DISCHARGE SUMMARY
Final Diagnosis


Discharge Date: 18





- Final Diagnosis


(1) Delivery normal


Is this a current diagnosis for this admission?: Yes   





(2) Grand multipara


Is this a current diagnosis for this admission?: Yes   





(3) History of depression


Is this a current diagnosis for this admission?: Yes   





(4) Personal history of fibromyalgia


Is this a current diagnosis for this admission?: Yes   





(5) Pregnancy


Is this a current diagnosis for this admission?: Yes   





Discharge Data





- Discharge Medication


Home Medications: 








Albuterol Sulfate [Ventolin Hfa 8 gm Mdi (1 Mdi/ER Disp)] 2 puff IH ASDIR PRN  








Gestational Age: 40.5 wks


Reason(s) for Admission: Induction of Labor


Prenatal Procedures: Ultrasound


Intrapartum Procedure(s): Spontaneous Vaginal Delivery





-  Data


  ** Baby 1 Male


Apgar at 1 minute: 9


Apgar at 5 minutes: 9


Weight: 3.289 kg


Home with Mother: Yes


Complications: No





- Diagnosis Test


Laboratory: 


 











Temp Pulse Resp BP Pulse Ox


 


 98.1 F   71   16   126/76 H  100 


 


 18 08:11  18 08:11  18 08:11  18 08:11  18 08:11








 











  18





  06:09 06:27 07:02


 


RBC   3.52 L  3.12 L


 


Hgb   10.2 L  9.0 L


 


Hct   29.2 L  26.1 L


 


Urine Opiates Screen  NEGATIVE  














- Discharge information/Instructions


Discharge Activity: Activity As Tolerated, Balance Activity w/Rest, Pelvic Rest

, Slowly Increase Activity, No tub bath


Discharge Diet: Regular


Disposition: HOME, SELF-CARE


Follow up with: [facility]


in: 4, Weeks

## 2018-07-20 NOTE — PDOC PROGRESS REPORT
Subjective-OB


Progress Note for:: 07/20/18


Subjective: 





Asking for early discharge if baby is discharged.





Physical Exam (OB)


Vital Signs: 


 











Temp Pulse Resp BP Pulse Ox


 


 98.2 F   67   16   103/53 L  100 


 


 07/20/18 00:00  07/20/18 00:00  07/20/18 00:00  07/20/18 00:00  07/20/18 00:00








 Intake & Output











 07/19/18 07/20/18 07/21/18





 06:59 06:59 06:59


 


Weight 86.8 kg  














- PIH/Pre-Eclampsia


Clonus: Negative





- Lochia


Lochia Amount: Scant < 10 ml


Lochia Color: Rubra/Red





- Abdomen


Description: Soft, Round


Hernia Present: No


Bowel Sounds: Normoactive


Flatus Presence: Present


Stool: Yes


Fundal Description: Firm, Midline


Fundal Height: u/u - u/2





Objective-Diagnostic


Laboratory: 


 





 07/20/18 07:02 





 











  07/20/18





  07:02


 


WBC  7.6


 


RBC  3.12 L


 


Hgb  9.0 L


 


Hct  26.1 L


 


MCV  83


 


MCH  28.7


 


MCHC  34.4


 


RDW  14.9 H


 


Plt Count  159

## 2018-07-30 NOTE — DELIVERY SUMMARY
=================================================================

Del Sum A-C

=================================================================

Datetime Report Generated by CPN: 2018 17:08

   

   

=================================================================

DELIVERY PERSONNEL

=================================================================

   

DELIVERY PERSONNEL:  [id number]

Delivery Doctor::  Fiordaliza Herrera CNM

Labor and Delivery Nurse::  Mecca Nelson RN

Labor and Delivery Nurse::  Aida Fernandez RN

   

=================================================================

MATERNAL INFORMATION

=================================================================

   

Delivery Anesthesia:  Epidural

Medications After Delivery:  Pitocin Drip 20 Units/1000ml NSS

Maternal Complications:  None

Provider Comments:  delivery of viable male apgars 9/9

   bulb suctioned on perineum

   AFIA

   left compound hand

   nuchal x 1 easily reduced

   infant to abdomen/ spontaneous cry

   EBL 150cc

   no lacerations

   cord blood obtained

   ff@[id number]

   hemostasis achieved

   

=================================================================

LABOR SUMMARY

=================================================================

   

EDC:  2018 00:00

No. Babies in Womb:  1

 Attempted:  No

Labor Anesthesia:  Epidural

   

=================================================================

LABOR INFORMATION

=================================================================

   

Reason for Induction:  Post Dates

Onset of Labor:  2018 07:45

Complete Dilatation:  2018 10:38

Oxytocin:  Induction

Group B Beta Strep:  Negative

Antibiotics # of Doses:  0

Antibiotics Time of Last Dose:  N/A

Name of Antibiotic Given:  N/A

Steroids Given:  None

Reason Steroids Not Administered:  Not Applicable

Other Reason Not Administered:  N/A

   

=================================================================

MEMBRANES

=================================================================

   

Membranes Rupture Method:  Artificial

Rupture of Membranes:  2018 08:56

Length of Rupture (hr):  1.88

Amniotic Fluid Color:  Clear

Amniotic Fluid Amount:  Small

Amniotic Fluid Odor:  Normal

   

=================================================================

STAGES OF LABOR

=================================================================

   

Stage 1 hr:  2

Stage 1 min:  53

Stage 2 hr:  0

Stage 2 min:  11

Stage 3 hr:  0

Stage 3 min:  5

Total Time in Labor hr:  3

Total Time in Labor min:  9

   

=================================================================

VAGINAL DELIVERY

=================================================================

   

Episiotomy:  None

Laceration #1:  None

Laceration Extension #1:  N/A

Laceration Repair:  Not Applicable

   

=================================================================

CSECTION DELIVERY

=================================================================

   

Primary Indication:  N/A

Secondary Indication:  N/A

   

=================================================================

BABY A INFORMATION

=================================================================

   

Infant Delivery Date/Time:  2018 10:49

Method of Delivery:  Vaginal

Born in Route :  No

:  N/A

Forceps:  N/A

Vacuum Extraction:  N/A

Shoulder Dystocia :  No

   

=================================================================

PRESENTATION/POSITION BABY A

=================================================================

   

Presentation:  Cephalic

Cephalic Presentation:  Vertex

Vertex Position:  Left Occipital Anterior

Breech Presentation:  N/A

   

=================================================================

PLACENTA INFORMATION BABY A

=================================================================

   

Placenta Delivery Time :  2018 10:54

Placenta Method of Delivery:  Spontaneous

Placenta Method of Delivery:  Spontaneous

Placenta Status:  Delivered

   

=================================================================

APGAR SCORES BABY A

=================================================================

   

Heart Rate 1 min:  >100 bpm

Resp Effort 1 min:  Good Cry

Reflex Irritability 1 min:  Cough or Sneeze or Pulls Away

Muscle Tone 1 min:  Active Motion

Color 1 min:  Body Pink, Extremities Blue

Resuscitation Effort 1 min:  Tactile Stimulation

APGAR SCORE 1 MIN:  9

Heart Rate 5 min:  >100 bpm

Resp Effort 5 min:  Good Cry

Reflex Irritability 5 min:  Cough or Sneeze or Pulls Away

Muscle Tone 5 min:  Active Motion

Color 5 min:  Body Pink, Extremities Blue

Resuscitation Effort 5 min:  Tactile Stimulation

APGAR SCORE 5 MIN:  9

   

=================================================================

INFANT INFORMATION BABY A

=================================================================

   

Gestational Age at Delivery:  40.5

Gestational Status:  Full Term- 39- 40.6 Weeks

Infant Outcome :  Liveborn

Infant Condition :  Stable

Infant Sex:  Male

   

=================================================================

IDENTIFICATION BABY A

=================================================================

   

Infant Verification Date/Time:  2018 12:02

ID Band Number:  [id number]

Mother's Name Verified:  Yes

Infant Medical Record Number:  [id number]

RN Verifying Infant:  D Bellavance RN/C Skelton RN

   

=================================================================

WEIGHT/LENGTH BABY A

=================================================================

   

Infant Birthweight (gm):  3300

Infant Weight (lb):  7

Infant Weight (oz):  4

Infant Length (in):  19.50

Infant Length (cm):  49.53

   

=================================================================

CORD INFORMATION BABY A

=================================================================

   

No. Cord Vessels:  3

Nuchal Cord :  Around Neck x1, Loose

Nuchal Cord- Other:  left compound hand

Cord Blood Taken:  Yes-For Storage (Mom's Blood type +)

Infant Suction:  Mouth; Nose

   

=================================================================

ASSESSMENT BABY A

=================================================================

   

Skin to Skin:  Yes

Skin to Skin Time (min):  60

   

=================================================================

BABY B INFORMATION

=================================================================

   

 :  N/A

   

=================================================================

SIGNATURES

=================================================================

   

Assignment:  Rangel Arroyo, MD

Signature:  Electronically signed by Fiordaliza Herrera CNM on 2018 at

   11:05  with User ID: [email or web address]

:  Electronically signed by Fiordaliza Herrera CNM on 2018 at 11:05 

   with User ID: [email or web address]

## 2018-09-21 LAB
ALBUMIN SERPL-MCNC: 4.5 G/DL (ref 3.5–5)
ALP SERPL-CCNC: 65 U/L (ref 38–126)
ALT SERPL-CCNC: 12 U/L (ref 9–52)
ANION GAP SERPL CALC-SCNC: 10 MMOL/L (ref 5–19)
APPEARANCE UR: (no result)
APTT PPP: YELLOW S
AST SERPL-CCNC: 19 U/L (ref 14–36)
BILIRUB DIRECT SERPL-MCNC: 0.3 MG/DL (ref 0–0.4)
BILIRUB SERPL-MCNC: 0.4 MG/DL (ref 0.2–1.3)
BILIRUB UR QL STRIP: NEGATIVE
BUN SERPL-MCNC: 11 MG/DL (ref 7–20)
CALCIUM: 9.8 MG/DL (ref 8.4–10.2)
CHLORIDE SERPL-SCNC: 110 MMOL/L (ref 98–107)
CO2 SERPL-SCNC: 23 MMOL/L (ref 22–30)
ERYTHROCYTE [DISTWIDTH] IN BLOOD BY AUTOMATED COUNT: 15.5 % (ref 11.5–14)
GLUCOSE SERPL-MCNC: 80 MG/DL (ref 75–110)
GLUCOSE UR STRIP-MCNC: NEGATIVE MG/DL
HCT VFR BLD CALC: 36.4 % (ref 36–47)
HGB BLD-MCNC: 12.5 G/DL (ref 12–15.5)
KETONES UR STRIP-MCNC: NEGATIVE MG/DL
MCH RBC QN AUTO: 28.8 PG (ref 27–33.4)
MCHC RBC AUTO-ENTMCNC: 34.4 G/DL (ref 32–36)
MCV RBC AUTO: 84 FL (ref 80–97)
NITRITE UR QL STRIP: POSITIVE
PH UR STRIP: 5 [PH] (ref 5–9)
PLATELET # BLD: 209 10^3/UL (ref 150–450)
POTASSIUM SERPL-SCNC: 4.4 MMOL/L (ref 3.6–5)
PROT SERPL-MCNC: 7.7 G/DL (ref 6.3–8.2)
PROT UR STRIP-MCNC: NEGATIVE MG/DL
RBC # BLD AUTO: 4.35 10^6/UL (ref 3.72–5.28)
SODIUM SERPL-SCNC: 143 MMOL/L (ref 137–145)
SP GR UR STRIP: 1.02
UROBILINOGEN UR-MCNC: NEGATIVE MG/DL (ref ?–2)
WBC # BLD AUTO: 6.8 10^3/UL (ref 4–10.5)

## 2018-09-24 ENCOUNTER — HOSPITAL ENCOUNTER (OUTPATIENT)
Dept: HOSPITAL 62 - OROUT | Age: 23
LOS: 1 days | Discharge: HOME | End: 2018-09-25
Attending: OBSTETRICS & GYNECOLOGY
Payer: COMMERCIAL

## 2018-09-24 DIAGNOSIS — F17.210: ICD-10-CM

## 2018-09-24 DIAGNOSIS — N85.8: ICD-10-CM

## 2018-09-24 DIAGNOSIS — N72: ICD-10-CM

## 2018-09-24 DIAGNOSIS — Z79.51: ICD-10-CM

## 2018-09-24 DIAGNOSIS — N94.6: Primary | ICD-10-CM

## 2018-09-24 DIAGNOSIS — J45.909: ICD-10-CM

## 2018-09-24 DIAGNOSIS — D64.9: ICD-10-CM

## 2018-09-24 DIAGNOSIS — G43.909: ICD-10-CM

## 2018-09-24 DIAGNOSIS — M79.7: ICD-10-CM

## 2018-09-24 DIAGNOSIS — N93.8: ICD-10-CM

## 2018-09-24 PROCEDURE — 86901 BLOOD TYPING SEROLOGIC RH(D): CPT

## 2018-09-24 PROCEDURE — 81025 URINE PREGNANCY TEST: CPT

## 2018-09-24 PROCEDURE — 85027 COMPLETE CBC AUTOMATED: CPT

## 2018-09-24 PROCEDURE — 81001 URINALYSIS AUTO W/SCOPE: CPT

## 2018-09-24 PROCEDURE — 58260 VAGINAL HYSTERECTOMY: CPT

## 2018-09-24 PROCEDURE — 86850 RBC ANTIBODY SCREEN: CPT

## 2018-09-24 PROCEDURE — 36415 COLL VENOUS BLD VENIPUNCTURE: CPT

## 2018-09-24 PROCEDURE — 94640 AIRWAY INHALATION TREATMENT: CPT

## 2018-09-24 PROCEDURE — 86900 BLOOD TYPING SEROLOGIC ABO: CPT

## 2018-09-24 PROCEDURE — S0119 ONDANSETRON 4 MG: HCPCS

## 2018-09-24 PROCEDURE — 80053 COMPREHEN METABOLIC PANEL: CPT

## 2018-09-24 PROCEDURE — 88307 TISSUE EXAM BY PATHOLOGIST: CPT

## 2018-09-24 RX ADMIN — MORPHINE SULFATE PRN MG: 10 INJECTION INTRAMUSCULAR; INTRAVENOUS; SUBCUTANEOUS at 12:49

## 2018-09-24 RX ADMIN — HYDROMORPHONE HYDROCHLORIDE ONE MG: 2 INJECTION INTRAMUSCULAR; INTRAVENOUS; SUBCUTANEOUS at 09:40

## 2018-09-24 RX ADMIN — OXYCODONE AND ACETAMINOPHEN PRN TAB: 5; 325 TABLET ORAL at 10:48

## 2018-09-24 RX ADMIN — HYDROMORPHONE HYDROCHLORIDE ONE MG: 2 INJECTION INTRAMUSCULAR; INTRAVENOUS; SUBCUTANEOUS at 09:50

## 2018-09-24 RX ADMIN — ONDANSETRON SCH MG: 4 TABLET, ORALLY DISINTEGRATING ORAL at 13:37

## 2018-09-24 RX ADMIN — FENTANYL CITRATE ONE MCG: 50 INJECTION INTRAMUSCULAR; INTRAVENOUS at 09:35

## 2018-09-24 RX ADMIN — MORPHINE SULFATE PRN MG: 10 INJECTION INTRAMUSCULAR; INTRAVENOUS; SUBCUTANEOUS at 21:03

## 2018-09-24 RX ADMIN — OXYCODONE AND ACETAMINOPHEN PRN TAB: 5; 325 TABLET ORAL at 17:28

## 2018-09-24 RX ADMIN — IBUPROFEN SCH MG: 800 TABLET, FILM COATED ORAL at 14:33

## 2018-09-24 RX ADMIN — ONDANSETRON SCH MG: 4 TABLET, ORALLY DISINTEGRATING ORAL at 23:08

## 2018-09-24 RX ADMIN — IBUPROFEN SCH MG: 800 TABLET, FILM COATED ORAL at 23:06

## 2018-09-24 RX ADMIN — FENTANYL CITRATE ONE MCG: 50 INJECTION INTRAMUSCULAR; INTRAVENOUS at 09:25

## 2018-09-24 NOTE — OPERATIVE REPORT E
Operative Report



NAME: JENNIFFER CEBALLOS

MRN:  [id number]          : 1995 AGE:  23Y

DATE OF SURGERY: 2018                        ROOM:



PREOPERATIVE DIAGNOSIS:

Dysmenorrhea, failed conservative therapy.



POSTOPERATIVE DIAGNOSIS:

Dysmenorrhea, failed conservative therapy.



PROCEDURE:

TVH.



SURGEON:

IRENA OLSEN M.D.



ESTIMATED BLOOD LOSS:

Less than 100 mL.



TISSUE REMOVED OR ALTERED:

Uterus.



ANESTHESIA:

General.



DESCRIPTION OF PROCEDURE:

The patient was placed in the dorsal lithotomy position, prepped and

draped in sterile fashion.  A speculum was placed.  Cervix was visualized

and grasped with Gabriel thyroid clamp.  Posterior cul-de-sac was entered

with sharp dissection.  Posterior parietal peritoneum was sutured *------*

2-0 Vicryl.  Left uterosacral was clamped, divided, and sutured with 2-0

Vicryl, repeated on the right.  Cervix was sharply circumscribed.  The

anterior parietal peritoneum was entered with sharp dissection, serial

clamps on each side of the uterus on the broad ligament, each pedicle

being clamped, divided, and sutured with 2-0 Vicryl, continued to the

level of the utero-ovarian ligaments, which were crossclamped and uterus

removed.  Utero-ovarian ligament was sutured with free tie of 2-0 Vicryl

followed by a suture tie of 2-0 Vicryl.  The pelvis was inspected and

hemostasis was noted.  Cuff was closed with interrupted sutures of 2-0

Vicryl and hemostasis was noted.  Urine remained clear throughout the

procedure and she was taken to recovery room in good condition.



DICTATING PHYSICIAN:  IRENA OLSEN M.D.





[id number]                  DT: [id number]

Y#: [id number]            DD: 2018

ID:   [id number]           JOB#: [id number]       ACCT: [id number]



cc:IRENA OLSEN M.D.

>

## 2018-09-25 VITALS — SYSTOLIC BLOOD PRESSURE: 129 MMHG | DIASTOLIC BLOOD PRESSURE: 77 MMHG

## 2018-09-25 RX ADMIN — OXYCODONE AND ACETAMINOPHEN PRN TAB: 5; 325 TABLET ORAL at 03:58

## 2018-09-25 RX ADMIN — IBUPROFEN SCH MG: 800 TABLET, FILM COATED ORAL at 06:08

## 2018-09-25 RX ADMIN — ONDANSETRON SCH MG: 4 TABLET, ORALLY DISINTEGRATING ORAL at 06:07

## 2018-09-25 NOTE — PDOC DISCHARGE SUMMARY
General





- Admit/Disc Date/PCP


Admission Date/Primary Care Provider: 


  





  EMILY STAPLES





Discharge Date: 09/25/18





- Discharge Diagnosis


(1) Abnormal uterine and vaginal bleeding, unspecified


Is this a current diagnosis for this admission?: Yes   





- Additional Information


Home Medications: 








Albuterol Sulfate [Ventolin Hfa 8 gm Mdi (1 Mdi/ER Disp)] 2 puff IH ASDIR PRN 07 /19/18 











History of Present Illness


History of Present Illness: 


JENNIFFER CEBALLOS is a 23 year old female








Hospital Course


Hospital Course: 





underwent vaginal hysterectomy without difficulty.  unremarkable post operative 

course.





Physical Exam





- Physical Exam


Vital Signs: 


 











Temp Pulse Resp BP Pulse Ox


 


 98.1 F   56 L  22 H  114/63   97 


 


 09/25/18 08:05  09/25/18 08:05  09/25/18 08:05  09/25/18 08:05  09/25/18 08:05








 Intake & Output











 09/24/18 09/25/18 09/26/18





 06:59 06:59 06:59


 


Intake Total  5830 


 


Output Total  2300 


 


Balance  3530 


 


Weight  80.29 kg 











General appearance: PRESENT: no acute distress, cooperative


GI/Abdominal exam: PRESENT: soft - nontender and non distended





Result


Laboratory Results: 


 





 09/21/18 11:15 





 09/21/18 11:15 











Plan


Discharge Plan: 





discharge home with scheduled follow up


Time Spent: Less than 30 Minutes

## 2018-10-10 ENCOUNTER — HOSPITAL ENCOUNTER (EMERGENCY)
Dept: HOSPITAL 62 - ER | Age: 23
Discharge: HOME | End: 2018-10-10
Payer: COMMERCIAL

## 2018-10-10 VITALS — SYSTOLIC BLOOD PRESSURE: 112 MMHG | DIASTOLIC BLOOD PRESSURE: 74 MMHG

## 2018-10-10 DIAGNOSIS — N39.0: Primary | ICD-10-CM

## 2018-10-10 DIAGNOSIS — N93.8: ICD-10-CM

## 2018-10-10 DIAGNOSIS — R10.2: ICD-10-CM

## 2018-10-10 DIAGNOSIS — F17.200: ICD-10-CM

## 2018-10-10 DIAGNOSIS — Z90.710: ICD-10-CM

## 2018-10-10 LAB
ADD MANUAL DIFF: NO
ALBUMIN SERPL-MCNC: 4.4 G/DL (ref 3.5–5)
ALP SERPL-CCNC: 68 U/L (ref 38–126)
ALT SERPL-CCNC: 18 U/L (ref 9–52)
ANION GAP SERPL CALC-SCNC: 10 MMOL/L (ref 5–19)
APPEARANCE UR: (no result)
APTT PPP: YELLOW S
AST SERPL-CCNC: 16 U/L (ref 14–36)
BACTERIA (WET MOUNT): (no result)
BASOPHILS # BLD AUTO: 0 10^3/UL (ref 0–0.2)
BASOPHILS NFR BLD AUTO: 0.8 % (ref 0–2)
BILIRUB DIRECT SERPL-MCNC: 0.3 MG/DL (ref 0–0.4)
BILIRUB SERPL-MCNC: 0.5 MG/DL (ref 0.2–1.3)
BILIRUB UR QL STRIP: NEGATIVE
BUN SERPL-MCNC: 10 MG/DL (ref 7–20)
CALCIUM: 9.9 MG/DL (ref 8.4–10.2)
CHLAM PCR: NOT DETECTED
CHLORIDE SERPL-SCNC: 106 MMOL/L (ref 98–107)
CO2 SERPL-SCNC: 23 MMOL/L (ref 22–30)
EOSINOPHIL # BLD AUTO: 0.1 10^3/UL (ref 0–0.6)
EOSINOPHIL NFR BLD AUTO: 2.6 % (ref 0–6)
EPITHELIALS (WET MOUNT): (no result)
ERYTHROCYTE [DISTWIDTH] IN BLOOD BY AUTOMATED COUNT: 14 % (ref 11.5–14)
GLUCOSE SERPL-MCNC: 88 MG/DL (ref 75–110)
GLUCOSE UR STRIP-MCNC: NEGATIVE MG/DL
GON PCR: NOT DETECTED
HCT VFR BLD CALC: 34.3 % (ref 36–47)
HGB BLD-MCNC: 11.5 G/DL (ref 12–15.5)
KETONES UR STRIP-MCNC: NEGATIVE MG/DL
LYMPHOCYTES # BLD AUTO: 2.3 10^3/UL (ref 0.5–4.7)
LYMPHOCYTES NFR BLD AUTO: 42.2 % (ref 13–45)
MCH RBC QN AUTO: 27.9 PG (ref 27–33.4)
MCHC RBC AUTO-ENTMCNC: 33.4 G/DL (ref 32–36)
MCV RBC AUTO: 84 FL (ref 80–97)
MONOCYTES # BLD AUTO: 0.2 10^3/UL (ref 0.1–1.4)
MONOCYTES NFR BLD AUTO: 4.2 % (ref 3–13)
NEUTROPHILS # BLD AUTO: 2.8 10^3/UL (ref 1.7–8.2)
NEUTS SEG NFR BLD AUTO: 50.2 % (ref 42–78)
NITRITE UR QL STRIP: NEGATIVE
PH UR STRIP: 5 [PH] (ref 5–9)
PLATELET # BLD: 289 10^3/UL (ref 150–450)
POTASSIUM SERPL-SCNC: 4.3 MMOL/L (ref 3.6–5)
PROT SERPL-MCNC: 7.4 G/DL (ref 6.3–8.2)
PROT UR STRIP-MCNC: NEGATIVE MG/DL
RBC # BLD AUTO: 4.1 10^6/UL (ref 3.72–5.28)
RBCS (WET MOUNT): (no result)
SODIUM SERPL-SCNC: 138.7 MMOL/L (ref 137–145)
SP GR UR STRIP: 1.01
T.VAGINALIS (WET MOUNT): (no result)
TOTAL CELLS COUNTED % (AUTO): 100 %
UROBILINOGEN UR-MCNC: NEGATIVE MG/DL (ref ?–2)
WBC # BLD AUTO: 5.5 10^3/UL (ref 4–10.5)
WBCS (WET MOUNT): (no result)
YEAST (WET MOUNT): (no result)

## 2018-10-10 PROCEDURE — 86900 BLOOD TYPING SEROLOGIC ABO: CPT

## 2018-10-10 PROCEDURE — 80053 COMPREHEN METABOLIC PANEL: CPT

## 2018-10-10 PROCEDURE — 99284 EMERGENCY DEPT VISIT MOD MDM: CPT

## 2018-10-10 PROCEDURE — 86901 BLOOD TYPING SEROLOGIC RH(D): CPT

## 2018-10-10 PROCEDURE — 87591 N.GONORRHOEAE DNA AMP PROB: CPT

## 2018-10-10 PROCEDURE — 87491 CHLMYD TRACH DNA AMP PROBE: CPT

## 2018-10-10 PROCEDURE — 86850 RBC ANTIBODY SCREEN: CPT

## 2018-10-10 PROCEDURE — 87086 URINE CULTURE/COLONY COUNT: CPT

## 2018-10-10 PROCEDURE — 85025 COMPLETE CBC W/AUTO DIFF WBC: CPT

## 2018-10-10 PROCEDURE — 87210 SMEAR WET MOUNT SALINE/INK: CPT

## 2018-10-10 PROCEDURE — 81001 URINALYSIS AUTO W/SCOPE: CPT

## 2018-10-10 PROCEDURE — 36415 COLL VENOUS BLD VENIPUNCTURE: CPT
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
## 2019-11-16 ENCOUNTER — HOSPITAL ENCOUNTER (EMERGENCY)
Dept: HOSPITAL 62 - ER | Age: 24
Discharge: HOME | End: 2019-11-16
Payer: COMMERCIAL

## 2019-11-16 VITALS — SYSTOLIC BLOOD PRESSURE: 128 MMHG | DIASTOLIC BLOOD PRESSURE: 70 MMHG

## 2019-11-16 DIAGNOSIS — J45.909: ICD-10-CM

## 2019-11-16 DIAGNOSIS — M25.561: Primary | ICD-10-CM

## 2019-11-16 DIAGNOSIS — F17.200: ICD-10-CM
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
## 2019-11-16 PROCEDURE — 99283 EMERGENCY DEPT VISIT LOW MDM: CPT

## 2019-11-16 PROCEDURE — 36415 COLL VENOUS BLD VENIPUNCTURE: CPT

## 2019-11-16 PROCEDURE — 85379 FIBRIN DEGRADATION QUANT: CPT
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
